# Patient Record
Sex: FEMALE | Race: WHITE | NOT HISPANIC OR LATINO | Employment: FULL TIME | URBAN - METROPOLITAN AREA
[De-identification: names, ages, dates, MRNs, and addresses within clinical notes are randomized per-mention and may not be internally consistent; named-entity substitution may affect disease eponyms.]

---

## 2017-01-03 ENCOUNTER — RESOLUTE PROFESSIONAL BILLING HOSPITAL PROF FEE (OUTPATIENT)
Dept: HOSPITALIST | Facility: MEDICAL CENTER | Age: 62
End: 2017-01-03
Payer: OTHER GOVERNMENT

## 2017-01-03 ENCOUNTER — APPOINTMENT (OUTPATIENT)
Dept: RADIOLOGY | Facility: MEDICAL CENTER | Age: 62
End: 2017-01-03
Attending: EMERGENCY MEDICINE
Payer: OTHER GOVERNMENT

## 2017-01-03 ENCOUNTER — HOSPITAL ENCOUNTER (OUTPATIENT)
Facility: MEDICAL CENTER | Age: 62
End: 2017-01-04
Attending: EMERGENCY MEDICINE | Admitting: INTERNAL MEDICINE
Payer: OTHER GOVERNMENT

## 2017-01-03 DIAGNOSIS — R06.02 SHORTNESS OF BREATH: ICD-10-CM

## 2017-01-03 DIAGNOSIS — R07.9 CHEST PAIN, UNSPECIFIED TYPE: ICD-10-CM

## 2017-01-03 LAB
ALBUMIN SERPL BCP-MCNC: 4.2 G/DL (ref 3.2–4.9)
ALBUMIN/GLOB SERPL: 1.8 G/DL
ALP SERPL-CCNC: 70 U/L (ref 30–99)
ALT SERPL-CCNC: 20 U/L (ref 2–50)
ANION GAP SERPL CALC-SCNC: 8 MMOL/L (ref 0–11.9)
APTT PPP: 30.5 SEC (ref 24.7–36)
AST SERPL-CCNC: 32 U/L (ref 12–45)
BASOPHILS # BLD AUTO: 0.8 % (ref 0–1.8)
BASOPHILS # BLD: 0.05 K/UL (ref 0–0.12)
BILIRUB SERPL-MCNC: 0.8 MG/DL (ref 0.1–1.5)
BNP SERPL-MCNC: 19 PG/ML (ref 0–100)
BUN SERPL-MCNC: 9 MG/DL (ref 8–22)
CALCIUM SERPL-MCNC: 9.5 MG/DL (ref 8.4–10.2)
CHLORIDE SERPL-SCNC: 105 MMOL/L (ref 96–112)
CO2 SERPL-SCNC: 25 MMOL/L (ref 20–33)
CREAT SERPL-MCNC: 0.77 MG/DL (ref 0.5–1.4)
DEPRECATED D DIMER PPP IA-ACNC: <200 NG/ML(D-DU)
EKG IMPRESSION: NORMAL
EKG IMPRESSION: NORMAL
EOSINOPHIL # BLD AUTO: 0.08 K/UL (ref 0–0.51)
EOSINOPHIL NFR BLD: 1.3 % (ref 0–6.9)
ERYTHROCYTE [DISTWIDTH] IN BLOOD BY AUTOMATED COUNT: 41.2 FL (ref 35.9–50)
GFR SERPL CREATININE-BSD FRML MDRD: >60 ML/MIN/1.73 M 2
GLOBULIN SER CALC-MCNC: 2.3 G/DL (ref 1.9–3.5)
GLUCOSE SERPL-MCNC: 99 MG/DL (ref 65–99)
HCT VFR BLD AUTO: 40.7 % (ref 37–47)
HGB BLD-MCNC: 14 G/DL (ref 12–16)
IMM GRANULOCYTES # BLD AUTO: 0.02 K/UL (ref 0–0.11)
IMM GRANULOCYTES NFR BLD AUTO: 0.3 % (ref 0–0.9)
INR PPP: 0.94 (ref 0.87–1.13)
LIPASE SERPL-CCNC: 30 U/L (ref 7–58)
LYMPHOCYTES # BLD AUTO: 1.61 K/UL (ref 1–4.8)
LYMPHOCYTES NFR BLD: 26.7 % (ref 22–41)
MCH RBC QN AUTO: 31.2 PG (ref 27–33)
MCHC RBC AUTO-ENTMCNC: 34.4 G/DL (ref 33.6–35)
MCV RBC AUTO: 90.6 FL (ref 81.4–97.8)
MONOCYTES # BLD AUTO: 0.39 K/UL (ref 0–0.85)
MONOCYTES NFR BLD AUTO: 6.5 % (ref 0–13.4)
NEUTROPHILS # BLD AUTO: 3.89 K/UL (ref 2–7.15)
NEUTROPHILS NFR BLD: 64.4 % (ref 44–72)
NRBC # BLD AUTO: 0 K/UL
NRBC BLD AUTO-RTO: 0 /100 WBC
PLATELET # BLD AUTO: 268 K/UL (ref 164–446)
PMV BLD AUTO: 8.9 FL (ref 9–12.9)
POTASSIUM SERPL-SCNC: 3.9 MMOL/L (ref 3.6–5.5)
PROT SERPL-MCNC: 6.5 G/DL (ref 6–8.2)
PROTHROMBIN TIME: 12.4 SEC (ref 12–14.6)
RBC # BLD AUTO: 4.49 M/UL (ref 4.2–5.4)
SODIUM SERPL-SCNC: 138 MMOL/L (ref 135–145)
TROPONIN I SERPL-MCNC: <0.02 NG/ML (ref 0–0.04)
TROPONIN I SERPL-MCNC: <0.02 NG/ML (ref 0–0.04)
TROPONIN I SERPL-MCNC: <0.05 NG/ML
WBC # BLD AUTO: 6 K/UL (ref 4.8–10.8)

## 2017-01-03 PROCEDURE — 700102 HCHG RX REV CODE 250 W/ 637 OVERRIDE(OP)

## 2017-01-03 PROCEDURE — 700102 HCHG RX REV CODE 250 W/ 637 OVERRIDE(OP): Performed by: EMERGENCY MEDICINE

## 2017-01-03 PROCEDURE — 85610 PROTHROMBIN TIME: CPT

## 2017-01-03 PROCEDURE — 71010 DX-CHEST-PORTABLE (1 VIEW): CPT

## 2017-01-03 PROCEDURE — 99285 EMERGENCY DEPT VISIT HI MDM: CPT

## 2017-01-03 PROCEDURE — A9270 NON-COVERED ITEM OR SERVICE: HCPCS | Performed by: INTERNAL MEDICINE

## 2017-01-03 PROCEDURE — 36415 COLL VENOUS BLD VENIPUNCTURE: CPT

## 2017-01-03 PROCEDURE — 96360 HYDRATION IV INFUSION INIT: CPT

## 2017-01-03 PROCEDURE — G0378 HOSPITAL OBSERVATION PER HR: HCPCS

## 2017-01-03 PROCEDURE — 85379 FIBRIN DEGRADATION QUANT: CPT

## 2017-01-03 PROCEDURE — 83690 ASSAY OF LIPASE: CPT

## 2017-01-03 PROCEDURE — 85730 THROMBOPLASTIN TIME PARTIAL: CPT

## 2017-01-03 PROCEDURE — 700102 HCHG RX REV CODE 250 W/ 637 OVERRIDE(OP): Performed by: INTERNAL MEDICINE

## 2017-01-03 PROCEDURE — 84484 ASSAY OF TROPONIN QUANT: CPT | Mod: 91

## 2017-01-03 PROCEDURE — 85025 COMPLETE CBC W/AUTO DIFF WBC: CPT

## 2017-01-03 PROCEDURE — 96361 HYDRATE IV INFUSION ADD-ON: CPT

## 2017-01-03 PROCEDURE — A9270 NON-COVERED ITEM OR SERVICE: HCPCS

## 2017-01-03 PROCEDURE — 700105 HCHG RX REV CODE 258: Performed by: EMERGENCY MEDICINE

## 2017-01-03 PROCEDURE — 80053 COMPREHEN METABOLIC PANEL: CPT

## 2017-01-03 PROCEDURE — 93005 ELECTROCARDIOGRAM TRACING: CPT | Performed by: EMERGENCY MEDICINE

## 2017-01-03 PROCEDURE — 99220 PR INITIAL OBSERVATION CARE,LEVL III: CPT | Performed by: INTERNAL MEDICINE

## 2017-01-03 PROCEDURE — 83880 ASSAY OF NATRIURETIC PEPTIDE: CPT

## 2017-01-03 PROCEDURE — A9270 NON-COVERED ITEM OR SERVICE: HCPCS | Performed by: EMERGENCY MEDICINE

## 2017-01-03 RX ORDER — ONDANSETRON 2 MG/ML
4 INJECTION INTRAMUSCULAR; INTRAVENOUS EVERY 4 HOURS PRN
Status: DISCONTINUED | OUTPATIENT
Start: 2017-01-03 | End: 2017-01-04 | Stop reason: HOSPADM

## 2017-01-03 RX ORDER — NITROGLYCERIN 0.4 MG/1
0.4 TABLET SUBLINGUAL ONCE
Status: COMPLETED | OUTPATIENT
Start: 2017-01-03 | End: 2017-01-03

## 2017-01-03 RX ORDER — SUMATRIPTAN 25 MG/1
100 TABLET, FILM COATED ORAL
Status: DISCONTINUED | OUTPATIENT
Start: 2017-01-03 | End: 2017-01-04 | Stop reason: HOSPADM

## 2017-01-03 RX ORDER — LAMOTRIGINE 100 MG/1
200 TABLET ORAL 2 TIMES DAILY
Status: DISCONTINUED | OUTPATIENT
Start: 2017-01-03 | End: 2017-01-04 | Stop reason: HOSPADM

## 2017-01-03 RX ORDER — ATORVASTATIN CALCIUM 10 MG/1
10 TABLET, FILM COATED ORAL
Status: DISCONTINUED | OUTPATIENT
Start: 2017-01-03 | End: 2017-01-04 | Stop reason: HOSPADM

## 2017-01-03 RX ORDER — ACETAMINOPHEN 325 MG/1
650 TABLET ORAL EVERY 6 HOURS PRN
Status: DISCONTINUED | OUTPATIENT
Start: 2017-01-03 | End: 2017-01-04 | Stop reason: HOSPADM

## 2017-01-03 RX ORDER — ONDANSETRON 4 MG/1
4 TABLET, ORALLY DISINTEGRATING ORAL EVERY 4 HOURS PRN
Status: DISCONTINUED | OUTPATIENT
Start: 2017-01-03 | End: 2017-01-04 | Stop reason: HOSPADM

## 2017-01-03 RX ORDER — NITROGLYCERIN 0.4 MG/1
TABLET SUBLINGUAL
Status: COMPLETED
Start: 2017-01-03 | End: 2017-01-03

## 2017-01-03 RX ORDER — PROMETHAZINE HYDROCHLORIDE 25 MG/1
12.5-25 TABLET ORAL EVERY 4 HOURS PRN
Status: DISCONTINUED | OUTPATIENT
Start: 2017-01-03 | End: 2017-01-04 | Stop reason: HOSPADM

## 2017-01-03 RX ORDER — PROMETHAZINE HYDROCHLORIDE 25 MG/1
12.5-25 SUPPOSITORY RECTAL EVERY 4 HOURS PRN
Status: DISCONTINUED | OUTPATIENT
Start: 2017-01-03 | End: 2017-01-04 | Stop reason: HOSPADM

## 2017-01-03 RX ORDER — ASPIRIN 81 MG/1
324 TABLET, CHEWABLE ORAL ONCE
Status: COMPLETED | OUTPATIENT
Start: 2017-01-03 | End: 2017-01-03

## 2017-01-03 RX ORDER — OMEPRAZOLE 20 MG/1
20 CAPSULE, DELAYED RELEASE ORAL DAILY
COMMUNITY
End: 2019-11-08

## 2017-01-03 RX ORDER — SODIUM CHLORIDE 9 MG/ML
INJECTION, SOLUTION INTRAVENOUS CONTINUOUS
Status: DISCONTINUED | OUTPATIENT
Start: 2017-01-03 | End: 2017-01-04 | Stop reason: HOSPADM

## 2017-01-03 RX ORDER — IBUPROFEN 200 MG
800 TABLET ORAL EVERY 6 HOURS PRN
COMMUNITY
End: 2021-02-10

## 2017-01-03 RX ORDER — MORPHINE SULFATE 4 MG/ML
2-4 INJECTION, SOLUTION INTRAMUSCULAR; INTRAVENOUS
Status: DISCONTINUED | OUTPATIENT
Start: 2017-01-03 | End: 2017-01-04 | Stop reason: HOSPADM

## 2017-01-03 RX ORDER — LAMOTRIGINE 200 MG/1
200 TABLET ORAL 2 TIMES DAILY
COMMUNITY
End: 2019-11-08

## 2017-01-03 RX ORDER — SUMATRIPTAN 100 MG/1
100 TABLET, FILM COATED ORAL
COMMUNITY
End: 2019-11-08

## 2017-01-03 RX ORDER — NITROGLYCERIN 0.4 MG/1
0.4 TABLET SUBLINGUAL
Status: DISCONTINUED | OUTPATIENT
Start: 2017-01-03 | End: 2017-01-04 | Stop reason: HOSPADM

## 2017-01-03 RX ADMIN — NITROGLYCERIN 0.4 MG: 0.4 TABLET SUBLINGUAL at 09:28

## 2017-01-03 RX ADMIN — ACETAMINOPHEN 650 MG: 325 TABLET, FILM COATED ORAL at 12:09

## 2017-01-03 RX ADMIN — LAMOTRIGINE 200 MG: 100 TABLET ORAL at 19:49

## 2017-01-03 RX ADMIN — SODIUM CHLORIDE: 9 INJECTION, SOLUTION INTRAVENOUS at 08:36

## 2017-01-03 RX ADMIN — ACETAMINOPHEN 650 MG: 325 TABLET, FILM COATED ORAL at 18:13

## 2017-01-03 RX ADMIN — ASPIRIN 81 MG CHEWABLE TABLET 324 MG: 81 TABLET CHEWABLE at 08:35

## 2017-01-03 ASSESSMENT — PAIN SCALES - WONG BAKER
WONGBAKER_NUMERICALRESPONSE: HURTS JUST A LITTLE BIT
WONGBAKER_NUMERICALRESPONSE: HURTS EVEN MORE

## 2017-01-03 ASSESSMENT — LIFESTYLE VARIABLES
EVER_SMOKED: NEVER
ALCOHOL_USE: NO

## 2017-01-03 ASSESSMENT — PAIN SCALES - GENERAL
PAINLEVEL_OUTOF10: 4
PAINLEVEL_OUTOF10: 6
PAINLEVEL_OUTOF10: 2

## 2017-01-03 NOTE — IP AVS SNAPSHOT
hi5 Access Code: [email or web address]  Expires: 2/3/2017  3:27 PM    hi5  A secure, online tool to manage your health information     [employer]’s hi5® is a secure, online tool that connects you to your personalized health information from the privacy of your home -- day or night - making it very easy for you to manage your healthcare. Once the activation process is completed, you can even access your medical information using the hi5 trey, which is available for free in the Apple Trey store or Google Play store.     hi5 provides the following levels of access (as shown below):   My Chart Features   [facility] Primary Care Doctor [facility]  Specialists [facility]  Urgent  Care Non-[facility]  Primary Care  Doctor   Email your healthcare team securely and privately 24/7 X X X X   Manage appointments: schedule your next appointment; view details of past/upcoming appointments X      Request prescription refills. X      View recent personal medical records, including lab and immunizations X X X X   View health record, including health history, allergies, medications X X X X   Read reports about your outpatient visits, procedures, consult and ER notes X X X X   See your discharge summary, which is a recap of your hospital and/or ER visit that includes your diagnosis, lab results, and care plan. X X       How to register for hi5:  1. Go to  [email or web address].  2. Click on the Sign Up Now box, which takes you to the New Member Sign Up page. You will need to provide the following information:  a. Enter your hi5 Access Code exactly as it appears at the top of this page. (You will not need to use this code after you’ve completed the sign-up process. If you do not sign up before the expiration date, you must request a new code.)   b. Enter your date of birth.   c. Enter your home email address.   d. Click Submit, and follow the next screen’s instructions.  3. Create a hi5 ID. This will be your hi5  login ID and cannot be changed, so think of one that is secure and easy to remember.  4. Create a [employer] password. You can change your password at any time.  5. Enter your Password Reset Question and Answer. This can be used at a later time if you forget your password.   6. Enter your e-mail address. This allows you to receive e-mail notifications when new information is available in [employer].  7. Click Sign Up. You can now view your health information.    For assistance activating your [employer] account, call [phone]

## 2017-01-03 NOTE — IP AVS SNAPSHOT
" <p align=\"LEFT\"><IMG SRC=\"//[email or web address]$/[email or web address]\" alt=\"Image\" WIDTH=\"50%\" HEIGHT=\"200\" BORDER=\"\"></p>                   Name:Chloé Cummings  Medical Record Number:[id number]  CSN: [id number]    YOB: 1955   Age: 61 y.o.  Sex: female  HT:1.676 m (5' 6\") WT: 68.1 kg (150 lb 2.1 oz)          Admit Date: 1/3/2017     Discharge Date:   Today's Date: 1/4/2017  Attending Doctor:  Madhuri Collado M.D.                  Allergies:  Nkda          Follow-up Information     1. Follow up with Nikki Lyn M.D.. Schedule an appointment as soon as possible for a visit in 2 weeks.    Specialty:  Family Medicine    Contact information    [address] [address]  [phone]           Medication List      Take these Medications        Instructions    aspirin 81 MG EC tablet    Take 1 Tab by mouth every day.   Dose:  81 mg       ibuprofen 200 MG Tabs   Commonly known as:  MOTRIN    Take 800 mg by mouth every 6 hours as needed for Mild Pain.   Dose:  800 mg       IMITREX 100 MG tablet   Generic drug:  sumatriptan    Take 100 mg by mouth Once PRN for Migraine.   Dose:  100 mg       LAMICTAL 200 MG tablet   Generic drug:  lamotrigine    Take 200 mg by mouth 2 times a day.   Dose:  200 mg       omeprazole 20 MG delayed-release capsule   Commonly known as:  PRILOSEC    Take 20 mg by mouth every day.   Dose:  20 mg         "

## 2017-01-03 NOTE — PROGRESS NOTES
Pt arrived to the floor about 1136, pt able to ambulate from the gurney to the bed independently. Admit profile and poc reviewed including no caffeine.  Kitchen at bedside during admit and lunch was ordered.  EKG done about 1315 when the order downloaded into the system. 2nd troponin negative. VSS.  Pt complains of a headache, but states the chest pain is getting better.  Pt has been showing SR thus far on her tele monitoring.

## 2017-01-03 NOTE — H&P
PRIMARY CARE PHYSICIAN:  Nikki Lyn MD    CHIEF COMPLAINT:  Chest pain.    HISTORY OF PRESENT ILLNESS:  This is a 61-year-old female with a past medical   history significant for history of SVT, migraines, who presents today for   evaluation of chest pain.  Patient states that this morning she felt dizzy and   lightheaded and was running into things, but denies any loss of   consciousness, vision changes, numbness, tingling, or headache.  She then   around 7 a.m. noticed she was having midsternal chest pain with radiation to   her breast and left axillary region associated with shortness of breath and   anxiety.  She also had nausea associated with it.  She describes it as a dull   pressing pain.  She states that she took nitro sublingual and her pain   subsided, but presently rates the pain at 3/10.  Denies any trauma to the   area.  Denies any constipation, diarrhea, abdominal pain, fevers, chills,   emesis, cough, sputum production, or diaphoresis.    REVIEW OF SYSTEMS:  A comprehensive review of systems was conducted and all   pertinent positive and negative are documented in the HPI.    PAST MEDICAL HISTORY:  Significant for migraines, history of SVT.    PAST SURGICAL HISTORY:  Significant for meningioma status post resection,   bilateral knee arthroscopy, and right shoulder arthroscopy.    SOCIAL HISTORY:  Patient denies any tobacco or illicit drug use.  She drinks   alcohol once a year.    FAMILY HISTORY:  Significant for mother who had MI at age 74 and hypertension.    ALLERGIES:  Patient has no known drug allergies.    HOME MEDICATIONS:  Significant for ibuprofen 800 mg q. 6 hours p.r.n. for   pain, Lamictal 200 mg b.i.d., and Imitrex 100 mg once p.r.n. for migraines.    PHYSICAL EXAMINATION:  VITAL SIGNS:  Temperature of 37.1, heart rate of 62, respirations of 16, blood   pressure of 137/74, and O2 saturation 97% on room air.  GENERAL:  This is a 61-year-old pleasant cooperative female in no acute    distress.  HEENT:  Normocephalic and atraumatic.  Pupils equal and reactive to light.    Extraocular motions intact.  Moist oral mucosa noted.  NECK:  Supple, normal range of motion.  No lymphadenopathy noted.  CARDIOVASCULAR:  Regular rate and rhythm.  No murmurs, rubs, or gallops noted.  LUNGS:  Clear to auscultation bilaterally.  No rhonchi, wheezes, or rales   heard.  ABDOMEN:  Soft, nontender, and nondistended.  Bowel sounds present.  EXTREMITIES:  No clubbing, cyanosis, or edema noted.  NEUROLOGIC:  Alert, awake, and oriented x3.  No focal deficits noted.  Cranial   nerves II-XII are grossly intact.  PSYCHIATRIC:  Normal mood, normal affect, normal judgment.  SKIN:  Warm, dry, no erythema or rash noted.    LABORATORY DATA:  WBC of 6, hemoglobin of 14, hematocrit of 40.7, and   platelets of 268.  INR of 0.94.  Lipase of 30.  D-dimer less than 200.    Troponin less than 0.05.  BNP of 19.  BUN of 9 and creatinine of 0.77.  Lipase   of 30.    IMAGING:  Chest x-ray shows no acute cardiopulmonary process.  EKG per my   read, sinus rhythm, rate of 75, QTC of 420.  No acute ST or T-wave   abnormalities noted.  No previous EKG available for comparison.    ASSESSMENT AND PLAN:  1.  For patient's chest pain, patient will be admitted to telemetry and we   will continue to monitor patient's troponin and EKG.  Nuclear stress test is   ordered for further evaluation.  Patient is started on aspirin 81 mg daily and   Lipitor 10 mg daily.  Lipid panel is ordered.  We will order morphine and   nitroglycerin sublingual p.r.n. for chest pain.  2.  For patient's migraines, patient is continued on her home regimen of   Lamictal 200 mg twice a day and Imitrex p.r.n.    DISPOSITION:  Observation.    PROPHYLAXIS:  Patient started on Lovenox subQ for DVT prophylaxis.  No GI   prophylaxis indicated.    CODE STATUS:  Full.       ____________________________________     MARQUES LÓPEZ MD MS / DARRON    DD:  01/03/2017 10:52:55  DT:   01/03/2017 11:15:00    D#:  [id number]  Job#:  [id number]

## 2017-01-03 NOTE — ED NOTES
Patient up to restroom. Patient given warm blanket. Patient states no change in symptoms post nitro.

## 2017-01-03 NOTE — IP AVS SNAPSHOT
1/4/2017          Chloé Cummings  [address] [place] [address]    Dear Chloé:    [facility] wants to ensure your discharge home is safe and you or your loved ones have had all your questions answered regarding your care after you leave the hospital.    You may receive a telephone call within two days of your discharge.  This call is to make certain you understand your discharge instructions as well as ensure we provided you with the best care possible during your stay with us.     The call will only last approximately 3-5 minutes and will be done by a nurse.    Once again, we want to ensure your discharge home is safe and that you have a clear understanding of any next steps in your care.  If you have any questions or concerns, please do not hesitate to contact us, we are here for you.  Thank you for choosing [facility] for your healthcare needs.    Sincerely,    Matthew Durbin    [facility]

## 2017-01-03 NOTE — ED PROVIDER NOTES
ED Provider Note    CHIEF COMPLAINT  No chief complaint on file.      HPI  Chloé Cummings is a 61 y.o. female who presents chest pain, mid chest, around 7 AM, moderate pain, did not wake her up, pain is partially burning, radiates to her left axilla. No history of similar episodes in the past. Does not take aspirin did not take nitroglycerin. Never gets chest pain with exercise. No nausea no vomiting no diaphoresis but does have some radiation of the pain, no other modifiers.    REVIEW OF SYSTEMS  See HPI for further details. History of bipolar disease, migraine headaches, herpes genitalis Denies other G.I., G.U.. endrocine, cardiovascular, respriatory or neurological problems. All other systems are negative.     PAST MEDICAL HISTORY  Past Medical History   Diagnosis Date   • Insomnia    • Bipolar 1 disorder (HCC)    • Migraines    • Herpes genitalis in women        FAMILY HISTORY mother with previous heart attack  Family History   Problem Relation Age of Onset   • Stroke Mother    • Heart Attack Mother    • Cancer Father      Lung   • Stroke Sister        SOCIAL HISTORY   he is a nonsmoker Social History     Social History   • Marital Status: Single     Spouse Name: N/A   • Number of Children: N/A   • Years of Education: N/A     Social History Main Topics   • Smoking status: Never Smoker    • Smokeless tobacco: Never Used   • Alcohol Use: No   • Drug Use: No   • Sexual Activity: Not Currently     Other Topics Concern   • None     Social History Narrative       SURGICAL HISTORY  Past Surgical History   Procedure Laterality Date   • Craniotomy  2011     Meningioma   • Arthroscopy, knee     • Shoulder arthroscopy         CURRENT MEDICATIONS  Home Medications     **Home medications have not yet been reviewed for this encounter**          ALLERGIES  Allergies   Allergen Reactions   • Nkda [No Known Drug Allergy]        PHYSICAL EXAM  VITAL SIGNS: /86 mmHg  Pulse 92  Temp(Src) 37.1 °C (98.8 °F)  Resp 16  Ht 1.676  "m (5' 6\")  Wt 67.7 kg (149 lb 4 oz)  BMI 24.10 kg/m2  Constitutional: Well developed, Well nourished, she appears very anxious No acute distress, Non-toxic appearance.   HENT: Normocephalic, Atraumatic, Bilateral external ears normal, Oropharynx moist, No oral exudates, Nose normal.   Eyes: PERRL, EOMI, Conjunctiva normal, No discharge.   Neck: Normal range of motion, No tenderness, Supple, No stridor.   Lymphatic: No lymphadenopathy noted.   Cardiovascular: Normal heart rate, Normal rhythm, No murmurs, No rubs, No gallops.   Thorax & Lungs: Normal breath sounds, No respiratory distress, No wheezing, No chest tenderness.   Abdomen:  No tenderness, no guarding no rigidity and the abdomen is soft.  No masses, No pulsatile masses.  Skin: Warm, Dry, No erythema, No rash.   Back: No tenderness, No CVA tenderness.   Extremities: Intact distal pulses, No edema, No tenderness, No cyanosis, No clubbing.   Musculoskeletal: Good range of motion in all major joints. No tenderness to palpation or major deformities noted.   Neurologic: Alert & oriented x 3, Normal motor function, Normal sensory function, No focal deficits noted.   Psychiatric: Affect normal, Judgment normal, Mood normal.   EKG Interpretation    Interpreted by me    Rhythm: normal sinus   Rate: normal  Axis: normal  Ectopy: none  Conduction: normal  ST Segments: no acute change  T Waves: no acute change  Q Waves: none    Clinical Impression: I do not have an old EKG to compare to      RADIOLOGY/PROCEDURES  DX-CHEST-PORTABLE (1 VIEW)   Final Result      Upper normal heart size without evidence of edema            COURSE & MEDICAL DECISION MAKING  Pertinent Labs & Imaging studies reviewed. (See chart for details)    Is having chest pain, moderate, partially pruritic, given IV normal saline nitroglycerin, aspirin. She does have risk factors for heart disease, will talk with hospitalist about admission.  FINAL IMPRESSION  1.   1. Chest pain, unspecified type    2. " Shortness of breath        2.   3.     Disposition    Electronically signed by: Panda Meza, 1/3/2017 8:16 AM

## 2017-01-03 NOTE — IP AVS SNAPSHOT
" After Visit Summary                                                                                                                  Name:Chloé Cummings  Medical Record Number:[id number]  CSN: [id number]    YOB: 1955   Age: 61 y.o.  Sex: female  HT:1.676 m (5' 6\") WT: 68.1 kg (150 lb 2.1 oz)          Admit Date: 1/3/2017     Discharge Date:   Today's Date: 1/4/2017  Attending Doctor:  Madhuri Collado M.D.                  Allergies:  Nkda            Discharge Instructions       Discharge Instructions    Discharged to home by car with self. Discharged via walking, hospital escort: Yes.  Special equipment needed: Not Applicable    Be sure to schedule a follow-up appointment with your primary care doctor or any specialists as instructed.     Discharge Plan:   Diet Plan: Discussed  Activity Level: Discussed  Confirmed Follow up Appointment: Patient to Call and Schedule Appointment  Confirmed Symptoms Management: Discussed  Medication Reconciliation Updated: Yes  Influenza Vaccine Indication: Not indicated: Previously immunized this influenza season and > 8 years of age    I understand that a diet low in cholesterol, fat, and sodium is recommended for good health. Unless I have been given specific instructions below for another diet, I accept this instruction as my diet prescription.   Other diet: keep yourself hydrated.    Special Instructions:      Nonspecific Chest Pain  It is often hard to find the cause of chest pain. There is always a chance that your pain could be related to something serious, such as a heart attack or a blood clot in your lungs. Chest pain can also be caused by conditions that are not life-threatening. If you have chest pain, it is very important to follow up with your doctor.    HOME CARE  · If you were prescribed an antibiotic medicine, finish it all even if you start to feel better.  · Avoid any activities that cause chest pain.  · Do not use any tobacco products, including cigarettes, " chewing tobacco, or electronic cigarettes. If you need help quitting, ask your doctor.  · Do not drink alcohol.  · Take medicines only as told by your doctor.  · Keep all follow-up visits as told by your doctor. This is important. This includes any further testing if your chest pain does not go away.  · Your doctor may tell you to keep your head raised (elevated) while you sleep.  · Make lifestyle changes as told by your doctor. These may include:  · Getting regular exercise. Ask your doctor to suggest some activities that are safe for you.  · Eating a heart-healthy diet. Your doctor or a diet specialist (dietitian) can help you to learn healthy eating options.  · Maintaining a healthy weight.  · Managing diabetes, if necessary.  · Reducing stress.  GET HELP IF:  · Your chest pain does not go away, even after treatment.  · You have a rash with blisters on your chest.  · You have a fever.  GET HELP RIGHT AWAY IF:  · Your chest pain is worse.  · You have an increasing cough, or you cough up blood.  · You have severe belly (abdominal) pain.  · You feel extremely weak.  · You pass out (faint).  · You have chills.  · You have sudden, unexplained chest discomfort.  · You have sudden, unexplained discomfort in your arms, back, neck, or jaw.  · You have shortness of breath at any time.  · You suddenly start to sweat, or your skin gets clammy.  · You feel nauseous.  · You vomit.  · You suddenly feel light-headed or dizzy.  · Your heart begins to beat quickly, or it feels like it is skipping beats.  These symptoms may be an emergency. Do not wait to see if the symptoms will go away. Get medical help right away. Call your local emergency services (911 in the [place] Do not drive yourself to the hospital.     This information is not intended to replace advice given to you by your health care provider. Make sure you discuss any questions you have with your health care provider.     Document Released: 06/05/2009 Document Revised:  01/08/2016 Document Reviewed: 07/24/2015  [email or web address] Interactive Patient Education ©2016 [email or web address]        · Is patient discharged on Warfarin / Coumadin?   No     · Is patient Post Blood Transfusion?  No    Depression / Suicide Risk    As you are discharged from this [facility] facility, it is important to learn how to keep safe from harming yourself.    Recognize the warning signs:  · Abrupt changes in personality, positive or negative- including increase in energy   · Giving away possessions  · Change in eating patterns- significant weight changes-  positive or negative  · Change in sleeping patterns- unable to sleep or sleeping all the time   · Unwillingness or inability to communicate  · Depression  · Unusual sadness, discouragement and loneliness  · Talk of wanting to die  · Neglect of personal appearance   · Rebelliousness- reckless behavior  · Withdrawal from people/activities they love  · Confusion- inability to concentrate     If you or a loved one observes any of these behaviors or has concerns about self-harm, here's what you can do:  · Talk about it- your feelings and reasons for harming yourself  · Remove any means that you might use to hurt yourself (examples: pills, rope, extension cords, firearm)  · Get professional help from the community (Mental Health, Substance Abuse, psychological counseling)  · Do not be alone:Call your Safe Contact- someone whom you trust who will be there for you.  · Call your local CRISIS HOTLINE 764-0510 or 804-747-9100  · Call your local Children's Mobile Crisis Response Team [place] (186) 283-7003 or www.USEUM  · Call the toll free National Suicide Prevention Hotlines   · National Suicide Prevention Lifeline 039-103-XEDA (9221)  · National Hope Line Network 800-SUICIDE (075-7610)        Follow-up Information     1. Follow up with Nikki Lyn M.D.. Schedule an appointment as soon as possible for a visit in 2 weeks.    Specialty:  Family Medicine      Contact information    [address] [address]  [place] [address]  [phone]           Discharge Medication Instructions:    Below are the medications your physician expects you to take upon discharge:    Review all your home medications and newly ordered medications with your doctor and/or pharmacist. Follow medication instructions as directed by your doctor and/or pharmacist.    Please keep your medication list with you and share with your physician.               Medication List      START taking these medications        Instructions    aspirin 81 MG EC tablet   Last time this was given:  81 mg on 1/4/2017  7:36 AM    Take 1 Tab by mouth every day.   Dose:  81 mg         CONTINUE taking these medications        Instructions    ibuprofen 200 MG Tabs   Commonly known as:  MOTRIN    Take 800 mg by mouth every 6 hours as needed for Mild Pain.   Dose:  800 mg       IMITREX 100 MG tablet   Last time this was given:  100 mg on 1/4/2017  7:39 AM   Generic drug:  sumatriptan    Take 100 mg by mouth Once PRN for Migraine.   Dose:  100 mg       LAMICTAL 200 MG tablet   Last time this was given:  200 mg on 1/3/2017  7:49 PM   Generic drug:  lamotrigine    Take 200 mg by mouth 2 times a day.   Dose:  200 mg       omeprazole 20 MG delayed-release capsule   Commonly known as:  PRILOSEC    Take 20 mg by mouth every day.   Dose:  20 mg               Instructions           Diet / Nutrition:    Follow any diet instructions given to you by your doctor or the dietician, including how much salt (sodium) you are allowed each day.    If you are overweight, talk to your doctor about a weight reduction plan.    Activity:    Remain physically active following your doctor's instructions about exercise and activity.    Rest often.     Any time you become even a little tired or short of breath, SIT DOWN and rest.    Worsening Symptoms:    Report any of the following signs and symptoms to the doctor's office immediately:    *Pain of jaw,  arm, or neck  *Chest pain not relieved by medication                               *Dizziness or loss of consciousness  *Difficulty breathing even when at rest   *More tired than usual                                       *Bleeding drainage or swelling of surgical site  *Swelling of feet, ankles, legs or stomach                 *Fever (>100ºF)  *Pink or blood tinged sputum  *Weight gain (3lbs/day or 5lbs /week)           *Shock from internal defibrillator (if applicable)  *Palpitations or irregular heartbeats                *Cool and/or numb extremities    Stroke Awareness    Common Risk Factors for Stroke include:    Age  Atrial Fibrillation  Carotid Artery Stenosis  Diabetes Mellitus  Excessive alcohol consumption  High blood pressure  Overweight   Physical inactivity  Smoking    Warning signs and symptoms of a stroke include:    *Sudden numbness or weakness of the face, arm or leg (especially on one side of the body).  *Sudden confusion, trouble speaking or understanding.  *Sudden trouble seeing in one or both eyes.  *Sudden trouble walking, dizziness, loss of balance or coordination.Sudden severe headache with no known cause.    It is very important to get treatment quickly when a stroke occurs. If you experience any of the above warning signs, call 911 immediately.                   Disclaimer         Quit Smoking / Tobacco Use:    I understand the use of any tobacco products increases my chance of suffering from future heart disease or stroke and could cause other illnesses which may shorten my life. Quitting the use of tobacco products is the single most important thing I can do to improve my health. For further information on smoking / tobacco cessation call a Toll Free Quit Line at 1-237.927.8024 (*National Cancer [place]) or 1-164.307.9061 (American Lung Association) or you can access the web based program at www.lungusa.org.    [place] Tobacco Users Help Line:  (429) 247-8908       Toll Free:  7-135-816-9007  Quit Tobacco Program [facility] (475)[phone]    Crisis Hotline:    National Crisis Hotline:  2-096-RAMZIND or 1-114.255.1135    [place] Crisis Hotline:    1-758.825.9540 or 826-591-5527    Discharge Survey:   Thank you for choosing [facility]. We hope we did everything we could to make your hospital stay a pleasant one. You may be receiving a phone survey and we would appreciate your time and participation in answering the questions. Your input is very valuable to us in our efforts to improve our service to our patients and their families.        My signature on this form indicates that:    1. I have reviewed and understand the above information.  2. My questions regarding this information have been answered to my satisfaction.  3. I have formulated a plan with my discharge nurse to obtain my prescribed medications for home.                  Disclaimer         __________________________________                     __________       ________                       Patient Signature                                                 Date                    Time

## 2017-01-04 ENCOUNTER — APPOINTMENT (OUTPATIENT)
Dept: RADIOLOGY | Facility: MEDICAL CENTER | Age: 62
End: 2017-01-04
Attending: INTERNAL MEDICINE
Payer: OTHER GOVERNMENT

## 2017-01-04 VITALS
TEMPERATURE: 97.2 F | WEIGHT: 150.13 LBS | BODY MASS INDEX: 24.13 KG/M2 | SYSTOLIC BLOOD PRESSURE: 127 MMHG | DIASTOLIC BLOOD PRESSURE: 57 MMHG | HEIGHT: 66 IN | OXYGEN SATURATION: 95 % | HEART RATE: 64 BPM | RESPIRATION RATE: 18 BRPM

## 2017-01-04 PROBLEM — R07.9 CHEST PAIN: Status: RESOLVED | Noted: 2017-01-03 | Resolved: 2017-01-04

## 2017-01-04 LAB
ANION GAP SERPL CALC-SCNC: 7 MMOL/L (ref 0–11.9)
BUN SERPL-MCNC: 7 MG/DL (ref 8–22)
CALCIUM SERPL-MCNC: 8.8 MG/DL (ref 8.4–10.2)
CHLORIDE SERPL-SCNC: 109 MMOL/L (ref 96–112)
CHOLEST SERPL-MCNC: 247 MG/DL (ref 100–199)
CO2 SERPL-SCNC: 25 MMOL/L (ref 20–33)
CREAT SERPL-MCNC: 0.74 MG/DL (ref 0.5–1.4)
ERYTHROCYTE [DISTWIDTH] IN BLOOD BY AUTOMATED COUNT: 42.2 FL (ref 35.9–50)
GFR SERPL CREATININE-BSD FRML MDRD: >60 ML/MIN/1.73 M 2
GLUCOSE SERPL-MCNC: 92 MG/DL (ref 65–99)
HCT VFR BLD AUTO: 39.3 % (ref 37–47)
HDLC SERPL-MCNC: 88 MG/DL
HGB BLD-MCNC: 13.4 G/DL (ref 12–16)
LDLC SERPL CALC-MCNC: 145 MG/DL
MCH RBC QN AUTO: 30.9 PG (ref 27–33)
MCHC RBC AUTO-ENTMCNC: 34.1 G/DL (ref 33.6–35)
MCV RBC AUTO: 90.8 FL (ref 81.4–97.8)
PLATELET # BLD AUTO: 249 K/UL (ref 164–446)
PMV BLD AUTO: 9.4 FL (ref 9–12.9)
POTASSIUM SERPL-SCNC: 4.1 MMOL/L (ref 3.6–5.5)
RBC # BLD AUTO: 4.33 M/UL (ref 4.2–5.4)
SODIUM SERPL-SCNC: 141 MMOL/L (ref 135–145)
TRIGL SERPL-MCNC: 68 MG/DL (ref 0–149)
WBC # BLD AUTO: 5.4 K/UL (ref 4.8–10.8)

## 2017-01-04 PROCEDURE — 99217 PR OBSERVATION CARE DISCHARGE: CPT | Performed by: INTERNAL MEDICINE

## 2017-01-04 PROCEDURE — 80048 BASIC METABOLIC PNL TOTAL CA: CPT

## 2017-01-04 PROCEDURE — 700105 HCHG RX REV CODE 258: Performed by: EMERGENCY MEDICINE

## 2017-01-04 PROCEDURE — 700111 HCHG RX REV CODE 636 W/ 250 OVERRIDE (IP)

## 2017-01-04 PROCEDURE — 85027 COMPLETE CBC AUTOMATED: CPT

## 2017-01-04 PROCEDURE — 80061 LIPID PANEL: CPT

## 2017-01-04 PROCEDURE — G0378 HOSPITAL OBSERVATION PER HR: HCPCS

## 2017-01-04 PROCEDURE — A9270 NON-COVERED ITEM OR SERVICE: HCPCS | Performed by: INTERNAL MEDICINE

## 2017-01-04 PROCEDURE — 700102 HCHG RX REV CODE 250 W/ 637 OVERRIDE(OP): Performed by: INTERNAL MEDICINE

## 2017-01-04 PROCEDURE — A9502 TC99M TETROFOSMIN: HCPCS

## 2017-01-04 RX ORDER — AMINOPHYLLINE 25 MG/ML
INJECTION, SOLUTION INTRAVENOUS
Status: COMPLETED
Start: 2017-01-04 | End: 2017-01-04

## 2017-01-04 RX ORDER — REGADENOSON 0.08 MG/ML
INJECTION, SOLUTION INTRAVENOUS
Status: COMPLETED
Start: 2017-01-04 | End: 2017-01-04

## 2017-01-04 RX ORDER — ASPIRIN 81 MG/1
81 TABLET ORAL DAILY
Qty: 30 TAB | Refills: 0 | Status: SHIPPED | OUTPATIENT
Start: 2017-01-04 | End: 2021-02-10

## 2017-01-04 RX ADMIN — REGADENOSON 0.4 MG: 0.08 INJECTION, SOLUTION INTRAVENOUS at 09:13

## 2017-01-04 RX ADMIN — ACETAMINOPHEN 650 MG: 325 TABLET, FILM COATED ORAL at 07:36

## 2017-01-04 RX ADMIN — ASPIRIN 81 MG: 81 TABLET, COATED ORAL at 07:36

## 2017-01-04 RX ADMIN — AMINOPHYLLINE 100 MG: 25 INJECTION, SOLUTION INTRAVENOUS at 09:21

## 2017-01-04 RX ADMIN — SUMATRIPTAN SUCCINATE 100 MG: 25 TABLET, FILM COATED ORAL at 07:39

## 2017-01-04 RX ADMIN — SODIUM CHLORIDE: 9 INJECTION, SOLUTION INTRAVENOUS at 05:49

## 2017-01-04 ASSESSMENT — PAIN SCALES - GENERAL: PAINLEVEL_OUTOF10: 8

## 2017-01-04 NOTE — DISCHARGE SUMMARY
CHIEF COMPLAINT ON ADMISSION  Chief Complaint   Patient presents with   • Chest Pain       CODE STATUS  Full Code    HPI & HOSPITAL COURSE  This is a 61 y.o. year old female here for chest pain evaluation. Patient was admitted to telemetry and no acute events overnight. Patient's troponin X 2 were negative. She was noted to have elevated LDL and statin therapy was recommended. Patient refused and stated she will follow up with her PCP and then decide at that point. Her nuclear stress test did not show reversible ischemia. She was chest pain free at time of discharge.      Therefore, she is discharged in good and stable condition with close outpatient follow-up.    SPECIFIC OUTPATIENT FOLLOW-UP  PCP in 1-2 weeks.     DISCHARGE PROBLEM LIST  Active Problems:    * No active hospital problems. *  Resolved Problems:    Chest pain POA: Unknown      FOLLOW UP  No future appointments.  No follow-up provider specified.    MEDICATIONS ON DISCHARGE   Chloé Cummings   Home Medication Instructions YASHIRA:[id number]    Printed on:01/04/17 3431   Medication Information                      aspirin EC 81 MG EC tablet  Take 1 Tab by mouth every day.             ibuprofen (MOTRIN) 200 MG Tab  Take 800 mg by mouth every 6 hours as needed for Mild Pain.             lamotrigine (LAMICTAL) 200 MG tablet  Take 200 mg by mouth 2 times a day.             omeprazole (PRILOSEC) 20 MG delayed-release capsule  Take 20 mg by mouth every day.             sumatriptan (IMITREX) 100 MG tablet  Take 100 mg by mouth Once PRN for Migraine.                 DIET  Orders Placed This Encounter   Procedures   • DIET ORDER     Standing Status: Standing      Number of Occurrences: 1      Standing Expiration Date:      Order Specific Question:  Diet:     Answer:  Regular [1]     Order Specific Question:  Miscellaneous modifications:     Answer:  Vegetarian [13]       ACTIVITY  As tolerated.      CONSULTATIONS  None     PROCEDURES  None     LABORATORY  Lab Results    Component Value Date/Time    SODIUM 141 01/04/2017 03:36 AM    POTASSIUM 4.1 01/04/2017 03:36 AM    CHLORIDE 109 01/04/2017 03:36 AM    CO2 25 01/04/2017 03:36 AM    GLUCOSE 92 01/04/2017 03:36 AM    BUN 7* 01/04/2017 03:36 AM    CREATININE 0.74 01/04/2017 03:36 AM        Lab Results   Component Value Date/Time    WBC 5.4 01/04/2017 03:36 AM    HEMOGLOBIN 13.4 01/04/2017 03:36 AM    HEMATOCRIT 39.3 01/04/2017 03:36 AM    PLATELET COUNT 249 01/04/2017 03:36 AM        Total time of the discharge process exceeds 38 minutes.

## 2017-01-04 NOTE — PROGRESS NOTES
1940: Pt assessed and medicated per MD order. Pt refusing statin. New IV placed 20g in L forearm x1 attempt. Pt denies c/p or any other c/o this pm.   2215: Pt sleeping with no s/s of distress.   2245: Lab at the bedside to draw third trop.  0200: Pt sleeping with no s/s of distress.   0445: Pt sleeping with no s/s of distress.   0545: Pt up to the restroom. No further needs at this time. New IVF bag hung  0640: Report given to Ari

## 2017-01-04 NOTE — DISCHARGE PLANNING
Medical Social Work    Referral: Pt discussed at IDT rounds this AM.    Intervention: Per flowsheet, pt lives alone and expects to d.c home.  If stress test is negative, then pt is a probable d.c today.  No SS needs identified nor any requests for KELSEY Bearden during rounds.      Plan: SW available for any assistance with d.c planning.

## 2017-01-04 NOTE — PROGRESS NOTES
Report received from Ari ITAN. Pt A&Ox 4, c/o headache. Fall precautions in place with call light in reach. POC discussed. All questions and concerns addressed.

## 2017-01-04 NOTE — CARE PLAN
Problem: Knowledge Deficit  Goal: Knowledge of disease process/condition, treatment plan, diagnostic tests, and medications will improve  Outcome: PROGRESSING AS EXPECTED  Diagnosis and POC discussed to include NPO status at midnight and what to expect with stress test. All questions and concerns addressed. Pt verbalized understanding.     Problem: Pain Management  Goal: Pain level will decrease to patient’s comfort goal  Outcome: PROGRESSING AS EXPECTED  0-10 pain scale used to assess and monitor pt pain level. Pain level slowly decreasing. Pt medicated accordingly.

## 2017-01-04 NOTE — PROGRESS NOTES
Nursing care plan includes knowledge deficit, potential for discomfort, potential for compromised cardiac output.  POC includes teaching, comfort measures and reassurance, and access to code cart, cardiology stand by and availability of rapid response team.  Pt verbalizes good understanding of benefits and risks of pharmacological cardiac stress test.  Informed consent obtained.  Lexiscan given, pt developed the following signs/symptoms:abdominal pressure, sob, then nausea developed after test completed..  Reversed with Aminophylline per protocol.  VS stable, symptoms resolved.  To waiting room, fluids and/or snack given, awaiting second scan.  Nursing goals met.

## 2017-01-04 NOTE — PROGRESS NOTES
Report received from ramu Durbin resting during report.  Checked the schedule when it came to the floor at 0730 and she is scheduled for 0800.  Morning medications and assessment completed about 0735.  Pt states her chest pain is resolved but her head is pounding.  She states she normally wakes up with a headache but it goes away when she sits up for awhile but today its becoming worse.  VSS.  Pt npo minus the water with her medications.  Reviewed her labs with her letting her know about her lipid panel.  Pt left for stress test about 0755 after voiding.

## 2017-01-04 NOTE — DISCHARGE INSTRUCTIONS
Discharge Instructions    Discharged to home by car with self. Discharged via walking, hospital escort: Yes.  Special equipment needed: Not Applicable    Be sure to schedule a follow-up appointment with your primary care doctor or any specialists as instructed.     Discharge Plan:   Diet Plan: Discussed  Activity Level: Discussed  Confirmed Follow up Appointment: Patient to Call and Schedule Appointment  Confirmed Symptoms Management: Discussed  Medication Reconciliation Updated: Yes  Influenza Vaccine Indication: Not indicated: Previously immunized this influenza season and > 8 years of age    I understand that a diet low in cholesterol, fat, and sodium is recommended for good health. Unless I have been given specific instructions below for another diet, I accept this instruction as my diet prescription.   Other diet: keep yourself hydrated.    Special Instructions:      Nonspecific Chest Pain  It is often hard to find the cause of chest pain. There is always a chance that your pain could be related to something serious, such as a heart attack or a blood clot in your lungs. Chest pain can also be caused by conditions that are not life-threatening. If you have chest pain, it is very important to follow up with your doctor.    HOME CARE  · If you were prescribed an antibiotic medicine, finish it all even if you start to feel better.  · Avoid any activities that cause chest pain.  · Do not use any tobacco products, including cigarettes, chewing tobacco, or electronic cigarettes. If you need help quitting, ask your doctor.  · Do not drink alcohol.  · Take medicines only as told by your doctor.  · Keep all follow-up visits as told by your doctor. This is important. This includes any further testing if your chest pain does not go away.  · Your doctor may tell you to keep your head raised (elevated) while you sleep.  · Make lifestyle changes as told by your doctor. These may include:  · Getting regular exercise. Ask your  doctor to suggest some activities that are safe for you.  · Eating a heart-healthy diet. Your doctor or a diet specialist (dietitian) can help you to learn healthy eating options.  · Maintaining a healthy weight.  · Managing diabetes, if necessary.  · Reducing stress.  GET HELP IF:  · Your chest pain does not go away, even after treatment.  · You have a rash with blisters on your chest.  · You have a fever.  GET HELP RIGHT AWAY IF:  · Your chest pain is worse.  · You have an increasing cough, or you cough up blood.  · You have severe belly (abdominal) pain.  · You feel extremely weak.  · You pass out (faint).  · You have chills.  · You have sudden, unexplained chest discomfort.  · You have sudden, unexplained discomfort in your arms, back, neck, or jaw.  · You have shortness of breath at any time.  · You suddenly start to sweat, or your skin gets clammy.  · You feel nauseous.  · You vomit.  · You suddenly feel light-headed or dizzy.  · Your heart begins to beat quickly, or it feels like it is skipping beats.  These symptoms may be an emergency. Do not wait to see if the symptoms will go away. Get medical help right away. Call your local emergency services (911 in the [place] Do not drive yourself to the hospital.     This information is not intended to replace advice given to you by your health care provider. Make sure you discuss any questions you have with your health care provider.     Document Released: 06/05/2009 Document Revised: 01/08/2016 Document Reviewed: 07/24/2015  [employer] Interactive Patient Education ©2016 [employer]        · Is patient discharged on Warfarin / Coumadin?   No     · Is patient Post Blood Transfusion?  No    Depression / Suicide Risk    As you are discharged from this [facility] facility, it is important to learn how to keep safe from harming yourself.    Recognize the warning signs:  · Abrupt changes in personality, positive or negative- including increase in energy   · Giving  away possessions  · Change in eating patterns- significant weight changes-  positive or negative  · Change in sleeping patterns- unable to sleep or sleeping all the time   · Unwillingness or inability to communicate  · Depression  · Unusual sadness, discouragement and loneliness  · Talk of wanting to die  · Neglect of personal appearance   · Rebelliousness- reckless behavior  · Withdrawal from people/activities they love  · Confusion- inability to concentrate     If you or a loved one observes any of these behaviors or has concerns about self-harm, here's what you can do:  · Talk about it- your feelings and reasons for harming yourself  · Remove any means that you might use to hurt yourself (examples: pills, rope, extension cords, firearm)  · Get professional help from the community (Mental Health, Substance Abuse, psychological counseling)  · Do not be alone:Call your Safe Contact- someone whom you trust who will be there for you.  · Call your local CRISIS HOTLINE 971-2852 or 284-379-8077  · Call your local Children's Mobile Crisis Response Team [place] (884) 693-4888 or www.FastSpring  · Call the toll free National Suicide Prevention Hotlines   · National Suicide Prevention Lifeline 600-011-HZUE (5593)  · National Hope Line Network 800-SUICIDE (243-2749)

## 2017-01-04 NOTE — PROGRESS NOTES
Tele summary  Rhythm: SR/ SB  Rate: 50's-80's  SC: 0.18  QRS: 0.08  QT: 0.38    Ectopy: none    Telemetry strips placed in pt chart.

## 2017-01-04 NOTE — PROGRESS NOTES
Pt reports her cp has resolved but her headache has come back.  Called kitchen for a second tray as pt is vegetarian.  Pt complaining her IV was hurting; tried repositioning but still complains of pain.  Removed IV and pt willing to let a new nurse try as she has really good veins.  VSS.    Tele Summary:    Strip at 1148 showed SR at 65.  Throughout the shift, pt had no ectopy.    Measurements from am strip were as follows:  CA=0.20  QRS=0.08  QT=0.40

## 2017-01-04 NOTE — PROGRESS NOTES
Pt returned from stress test about 1040. Early lunch tray arrived about 1130.  Pt states her headache is gone.  Dr. Collado rounded about 1145 and told her she would go home if her stress test was negative.  Results came back and received orders for discharge.  Pt verbalized understanding of making f/u appt, start taking asa, have a conversation with pcp about ldl and s/s of when to return back to ED.  Pt walked herself out.

## 2017-05-22 RX ORDER — ACYCLOVIR 400 MG/1
TABLET ORAL
Refills: 4 | OUTPATIENT
Start: 2017-05-22

## 2017-05-22 NOTE — TELEPHONE ENCOUNTER
Was the patient seen in the last year in this department? No     Does patient have an active prescription for medications requested? No     Received Request Via: Pharmacy     Last seen: 04/06/2016 Key

## 2017-05-22 NOTE — TELEPHONE ENCOUNTER
Refill declined.  Patient is being seen by Dr. Lyn.  Please have pharmacy contact new PCP and send prescription to them.  Tino Vanessa M.D.

## 2017-10-16 ENCOUNTER — OFFICE VISIT (OUTPATIENT)
Dept: URGENT CARE | Facility: CLINIC | Age: 62
End: 2017-10-16

## 2017-10-16 VITALS
BODY MASS INDEX: 20.89 KG/M2 | TEMPERATURE: 98.7 F | RESPIRATION RATE: 16 BRPM | HEIGHT: 66 IN | OXYGEN SATURATION: 96 % | SYSTOLIC BLOOD PRESSURE: 114 MMHG | DIASTOLIC BLOOD PRESSURE: 80 MMHG | HEART RATE: 101 BPM | WEIGHT: 130 LBS

## 2017-10-16 DIAGNOSIS — J22 LRTI (LOWER RESPIRATORY TRACT INFECTION): ICD-10-CM

## 2017-10-16 PROCEDURE — 99214 OFFICE O/P EST MOD 30 MIN: CPT | Performed by: FAMILY MEDICINE

## 2017-10-16 RX ORDER — AZITHROMYCIN 250 MG/1
TABLET, FILM COATED ORAL
Qty: 6 TAB | Refills: 0 | Status: SHIPPED | OUTPATIENT
Start: 2017-10-16 | End: 2019-08-27

## 2017-10-16 RX ORDER — ATOMOXETINE 10 MG/1
10 CAPSULE ORAL DAILY
COMMUNITY
End: 2019-11-08

## 2017-10-16 RX ORDER — CODEINE PHOSPHATE AND GUAIFENESIN 10; 100 MG/5ML; MG/5ML
5 SOLUTION ORAL EVERY 4 HOURS PRN
Qty: 90 ML | Refills: 0 | Status: SHIPPED | OUTPATIENT
Start: 2017-10-16 | End: 2019-08-27

## 2017-10-16 NOTE — PROGRESS NOTES
"CC:  cough        Cough  This is a new problem. The current episode started 1 week ago. The problem has been gradually worsening. The problem occurs constantly. The cough is productive of sputum. Associated symptoms include ear congestion, ear pain and nasal congestion. Pertinent negatives include no fever, headaches, sweats, weight loss or wheezing. Nothing aggravates the symptoms.  Patient has tried decongestant for the symptoms - minimal relief. There is no history of asthma.     Social History   Substance Use Topics   • Smoking status: Never Smoker   • Smokeless tobacco: Never Used   • Alcohol use No         Current Outpatient Prescriptions on File Prior to Visit   Medication Sig Dispense Refill   • lamotrigine (LAMICTAL) 200 MG tablet Take 200 mg by mouth 2 times a day.     • aspirin EC 81 MG EC tablet Take 1 Tab by mouth every day. 30 Tab 0   • sumatriptan (IMITREX) 100 MG tablet Take 100 mg by mouth Once PRN for Migraine.     • ibuprofen (MOTRIN) 200 MG Tab Take 800 mg by mouth every 6 hours as needed for Mild Pain.     • omeprazole (PRILOSEC) 20 MG delayed-release capsule Take 20 mg by mouth every day.       No current facility-administered medications on file prior to visit.                 Review of Systems   Constitutional: Negative for fever and weight loss.   HENT: negative for ear pain.    Cardiovascular - denies chest pain or dyspnea  Respiratory: Positive for cough.  Cough is productive.  Negative for wheezing.    Neurological: Negative for headaches.   GI - denies nausea, vomiting or diarrhea  Neuro - denies numbness or tingling.            Objective:     Blood pressure 114/80, pulse (!) 101, temperature 37.1 °C (98.7 °F), resp. rate 16, height 1.676 m (5' 6\"), weight 59 kg (130 lb), SpO2 96 %.    Physical Exam   Constitutional: patient is oriented to person, place, and time. Patient appears well-developed and well-nourished. No distress.   HENT:   Head: Normocephalic and atraumatic.   Right Ear: " External ear normal.   Left Ear: External ear normal.   Nose: Mucosal edema present. Right sinus exhibits no maxillary sinus tenderness. Left sinus exhibits no maxillary sinus tenderness.   Mouth/Throat: Mucous membranes are normal. No oral lesions. Posterior oropharyngeal erythema present. No oropharyngeal exudate or posterior oropharyngeal edema.   Eyes: Conjunctivae and EOM are normal. Pupils are equal, round, and reactive to light. Right eye exhibits no discharge. Left eye exhibits no discharge. No scleral icterus.   Neck: Normal range of motion. Neck supple. No tracheal deviation present.   Cardiovascular: Normal rate, regular rhythm and normal heart sounds.  Exam reveals no friction rub.    Pulmonary/Chest: Effort normal. No respiratory distress. Patient has no wheezes. Patient has rhonchi. Patient has no rales.    Musculoskeletal:  exhibits no edema.   Lymphadenopathy:     Patient has no cervical adenopathy  Neurological: patient is alert and oriented to person, place, and time.   Skin: Skin is warm and dry. No rash noted. No erythema.   Psychiatric: patient  has a normal mood and affect.  behavior is normal.   Nursing note and vitals reviewed.              Assessment/Plan:     1. LRTI (lower respiratory tract infection)        - azithromycin (ZITHROMAX) 250 MG Tab; Take as directed  Dispense: 6 Tab; Refill: 0  - guaifenesin-codeine (CHERATUSSIN AC) Solution oral solution; Take 5 mL by mouth every four hours as needed for Cough.  Dispense: 90 mL; Refill: 0    Follow up in one week if no improvement, sooner if symptoms worsen.

## 2019-08-22 ENCOUNTER — OFFICE VISIT (OUTPATIENT)
Dept: URGENT CARE | Facility: CLINIC | Age: 64
End: 2019-08-22

## 2019-08-22 ENCOUNTER — HOSPITAL ENCOUNTER (OUTPATIENT)
Facility: MEDICAL CENTER | Age: 64
End: 2019-08-22
Attending: PHYSICIAN ASSISTANT

## 2019-08-22 VITALS
HEART RATE: 75 BPM | RESPIRATION RATE: 18 BRPM | SYSTOLIC BLOOD PRESSURE: 110 MMHG | DIASTOLIC BLOOD PRESSURE: 78 MMHG | OXYGEN SATURATION: 97 % | HEIGHT: 66 IN | TEMPERATURE: 98.7 F | BODY MASS INDEX: 22.5 KG/M2 | WEIGHT: 140 LBS

## 2019-08-22 DIAGNOSIS — N89.8 VAGINAL DISCHARGE: ICD-10-CM

## 2019-08-22 DIAGNOSIS — N76.0 BACTERIAL VAGINOSIS: ICD-10-CM

## 2019-08-22 DIAGNOSIS — B96.89 BACTERIAL VAGINOSIS: ICD-10-CM

## 2019-08-22 LAB
APPEARANCE UR: CLEAR
BILIRUB UR STRIP-MCNC: NORMAL MG/DL
COLOR UR AUTO: NORMAL
GLUCOSE UR STRIP.AUTO-MCNC: NORMAL MG/DL
KETONES UR STRIP.AUTO-MCNC: NEGATIVE MG/DL
LEUKOCYTE ESTERASE UR QL STRIP.AUTO: NORMAL
NITRITE UR QL STRIP.AUTO: NEGATIVE
PH UR STRIP.AUTO: 7 [PH] (ref 5–8)
PROT UR QL STRIP: NORMAL MG/DL
RBC UR QL AUTO: NORMAL
SP GR UR STRIP.AUTO: 1.01
UROBILINOGEN UR STRIP-MCNC: 0.2 MG/DL

## 2019-08-22 PROCEDURE — 81002 URINALYSIS NONAUTO W/O SCOPE: CPT | Performed by: PHYSICIAN ASSISTANT

## 2019-08-22 PROCEDURE — 99214 OFFICE O/P EST MOD 30 MIN: CPT | Performed by: PHYSICIAN ASSISTANT

## 2019-08-22 PROCEDURE — 87660 TRICHOMONAS VAGIN DIR PROBE: CPT

## 2019-08-22 PROCEDURE — 87510 GARDNER VAG DNA DIR PROBE: CPT

## 2019-08-22 PROCEDURE — 87480 CANDIDA DNA DIR PROBE: CPT

## 2019-08-22 RX ORDER — BUPROPION HYDROCHLORIDE 150 MG/1
150 TABLET, EXTENDED RELEASE ORAL 2 TIMES DAILY
COMMUNITY
End: 2020-03-10 | Stop reason: SDUPTHER

## 2019-08-22 RX ORDER — METRONIDAZOLE 500 MG/1
500 TABLET ORAL
Qty: 14 TAB | Refills: 0 | Status: SHIPPED | OUTPATIENT
Start: 2019-08-22 | End: 2019-08-27

## 2019-08-22 ASSESSMENT — ENCOUNTER SYMPTOMS
VAGINITIS: 1
FEVER: 0
CONSTIPATION: 0
ABDOMINAL PAIN: 0
DIARRHEA: 0
EYE DISCHARGE: 0
VOMITING: 0
SORE THROAT: 0
EYE REDNESS: 0
NAUSEA: 0
SHORTNESS OF BREATH: 0
MYALGIAS: 0
COUGH: 0
HEADACHES: 0

## 2019-08-22 NOTE — PROGRESS NOTES
Subjective:      Chloé Cummings is a 64 y.o. female who presents with Yeast Infection (vaginal discharge,yellowish,no odor-several weeks)        Vaginitis   The patient's primary symptoms include vaginal discharge. The patient's pertinent negatives include no genital itching or genital odor. This is a new problem. Episode onset: x several weeks. The problem occurs constantly. The problem has been unchanged. The patient is experiencing no pain. Associated symptoms include frequency and urgency. Pertinent negatives include no abdominal pain, constipation, diarrhea, dysuria, fever, headaches, hematuria, nausea, rash, sore throat or vomiting. The vaginal discharge was yellow. There has been no bleeding. Nothing aggravates the symptoms. Treatments tried: OTC vaginal test kit - indicated the pH was abnormal. She is not sexually active.     PMH:  has a past medical history of Bipolar 2 disorder (HCC), Herpes genitalis in women, Insomnia, and Migraines.  MEDS:   Current Outpatient Medications:   •  buPROPion SR (WELLBUTRIN SR) 150 MG TABLET SR 12 HR sustained-release tablet, Take 150 mg by mouth 2 times a day., Disp: , Rfl:   •  atomoxetine (STRATTERA) 10 MG capsule, Take 10 mg by mouth every day., Disp: , Rfl:   •  azithromycin (ZITHROMAX) 250 MG Tab, Take as directed, Disp: 6 Tab, Rfl: 0  •  guaifenesin-codeine (CHERATUSSIN AC) Solution oral solution, Take 5 mL by mouth every four hours as needed for Cough., Disp: 90 mL, Rfl: 0  •  aspirin EC 81 MG EC tablet, Take 1 Tab by mouth every day., Disp: 30 Tab, Rfl: 0  •  sumatriptan (IMITREX) 100 MG tablet, Take 100 mg by mouth Once PRN for Migraine., Disp: , Rfl:   •  ibuprofen (MOTRIN) 200 MG Tab, Take 800 mg by mouth every 6 hours as needed for Mild Pain., Disp: , Rfl:   •  lamotrigine (LAMICTAL) 200 MG tablet, Take 200 mg by mouth 2 times a day., Disp: , Rfl:   •  omeprazole (PRILOSEC) 20 MG delayed-release capsule, Take 20 mg by mouth every day., Disp: , Rfl:   ALLERGIES:  "  Allergies   Allergen Reactions   • Nkda [No Known Drug Allergy]      SURGHX:   Past Surgical History:   Procedure Laterality Date   • CRANIOTOMY  2011    Meningioma   • ARTHROSCOPY, KNEE     • SHOULDER ARTHROSCOPY       SOCHX:  reports that she has never smoked. She has never used smokeless tobacco. She reports that she does not drink alcohol or use drugs.  FH: Family history was reviewed, no pertinent findings to report    Review of Systems   Constitutional: Negative for fever.   HENT: Negative for congestion, ear pain and sore throat.    Eyes: Negative for discharge and redness.   Respiratory: Negative for cough and shortness of breath.    Cardiovascular: Negative for chest pain and leg swelling.   Gastrointestinal: Negative for abdominal pain, constipation, diarrhea, nausea and vomiting.   Genitourinary: Positive for frequency, urgency and vaginal discharge. Negative for dysuria and hematuria.   Musculoskeletal: Negative for myalgias.   Skin: Negative for rash.   Neurological: Negative for headaches.   All other systems reviewed and are negative.         Objective:     /78 (BP Location: Left arm)   Pulse 75   Temp 37.1 °C (98.7 °F) (Temporal)   Resp 18   Ht 1.676 m (5' 6\")   Wt 63.5 kg (140 lb)   SpO2 97%   BMI 22.60 kg/m²      Physical Exam   Constitutional: She is oriented to person, place, and time. She appears well-developed and well-nourished. No distress.   HENT:   Head: Normocephalic and atraumatic.   Right Ear: External ear normal.   Left Ear: External ear normal.   Nose: Nose normal.   Mouth/Throat: Oropharynx is clear and moist.   Eyes: Conjunctivae and EOM are normal.   Neck: Normal range of motion. Neck supple.   Cardiovascular: Normal rate, regular rhythm and normal heart sounds.   Pulmonary/Chest: Effort normal and breath sounds normal.   Abdominal: Soft. There is no tenderness. There is no CVA tenderness.   Musculoskeletal: Normal range of motion.   Neurological: She is alert and " oriented to person, place, and time.   Skin: Skin is warm and dry.          Progress:  Results for orders placed or performed in visit on 08/22/19   POCT Urinalysis   Result Value Ref Range    POC Color yelow Negative    POC Appearance clear Negative    POC Leukocyte Esterase small Negative    POC Nitrites negative Negative    POC Urobiligen 0.2 Negative (0.2) mg/dL    POC Protein neg Negative mg/dL    POC Urine PH 7.0 5.0 - 8.0    POC Blood trace Negative    POC Specific Gravity 1.015 <1.005 - >1.030    POC Ketones negative Negative mg/dL    POC Bilirubin neg Negative mg/dL    POC Glucose neg Negative mg/dL     Vaginal Pathogens - pending     Assessment/Plan:     1. Vaginal discharge  - POCT Urinalysis  - VAGINAL PATHOGENS DNA PANEL; Future    2. Bacterial vaginosis  -Metronidazole (Flagyl) 500mg Tab sig: Take 1 Tab by mouth 2 Times a Day for 7 days    The patient's presenting symptoms and physical exam are consistent with vaginal discharge.  Will test the patient for vaginal pathogens to further assess the patient's symptoms.  The patient declined a pelvic exam today in clinic.  The patient's urinalysis today in clinic showed trace leukocyte esterace and trace blood. This is likely to to contamination given the patient's vaginal discharge.  Based on the patient's presenting symptoms, it is likely her vaginal discharge is due to Gardnerella. Will prescribe the patient Metronidazole for a presumed bacterial vaginosis infection.  Recommend the patient follow-up with her PCP.  Discussed return precautions with the patient, and she verbalized understanding.    OTC Tylenol or Motrin for fever/discomfort.  Drink plenty fluids  Follow-up with PCP  Return to clinic or go to the ED if symptoms worsen or fail to improve, or if the patient should develop worsening/increasing vaginal discharge, vaginal bleeding, urinary symptoms, pelvic pain, nausea/vomiting, fever/chills, and/or any concerning symptoms.    Discussed plan with  the patient, and she agrees to the above.

## 2019-08-23 LAB
AMBIGUOUS DTTM AMBI4: NORMAL
CANDIDA DNA VAG QL PROBE+SIG AMP: NEGATIVE
G VAGINALIS DNA VAG QL PROBE+SIG AMP: POSITIVE
SIGNIFICANT IND 70042: NORMAL
SITE SITE: NORMAL
SOURCE SOURCE: NORMAL
T VAGINALIS DNA VAG QL PROBE+SIG AMP: NEGATIVE

## 2019-08-27 ENCOUNTER — OFFICE VISIT (OUTPATIENT)
Dept: URGENT CARE | Facility: CLINIC | Age: 64
End: 2019-08-27

## 2019-08-27 VITALS
RESPIRATION RATE: 18 BRPM | WEIGHT: 140 LBS | BODY MASS INDEX: 23.32 KG/M2 | TEMPERATURE: 99.2 F | SYSTOLIC BLOOD PRESSURE: 102 MMHG | HEART RATE: 70 BPM | DIASTOLIC BLOOD PRESSURE: 78 MMHG | HEIGHT: 65 IN | OXYGEN SATURATION: 95 %

## 2019-08-27 DIAGNOSIS — R07.81 RIB PAIN ON LEFT SIDE: ICD-10-CM

## 2019-08-27 DIAGNOSIS — J01.10 ACUTE NON-RECURRENT FRONTAL SINUSITIS: Primary | ICD-10-CM

## 2019-08-27 PROCEDURE — 99214 OFFICE O/P EST MOD 30 MIN: CPT | Performed by: PHYSICIAN ASSISTANT

## 2019-08-27 RX ORDER — AMOXICILLIN AND CLAVULANATE POTASSIUM 875; 125 MG/1; MG/1
1 TABLET, FILM COATED ORAL 2 TIMES DAILY
Qty: 14 TAB | Refills: 0 | Status: SHIPPED | OUTPATIENT
Start: 2019-08-27 | End: 2019-09-03

## 2019-08-27 ASSESSMENT — ENCOUNTER SYMPTOMS
SHORTNESS OF BREATH: 0
SORE THROAT: 1
DIARRHEA: 0
VOMITING: 0
CHILLS: 0
COUGH: 0
HOARSE VOICE: 1
SINUS PRESSURE: 1
CONSTIPATION: 0
ABDOMINAL PAIN: 0
FEVER: 0
NAUSEA: 0

## 2019-08-27 NOTE — PATIENT INSTRUCTIONS
Rib Fracture  A rib fracture is a break or crack in one of the bones of the ribs. The ribs are like a cage that goes around your upper chest. A broken or cracked rib is often painful, but most do not cause other problems. Most rib fractures heal on their own in 1-3 months.  Follow these instructions at home:  · Avoid activities that cause pain to the injured area. Protect your injured area.  · Slowly increase activity as told by your doctor.  · Take medicine as told by your doctor.  · Put ice on the injured area for the first 1-2 days after you have been treated or as told by your doctor.  ¨ Put ice in a plastic bag.  ¨ Place a towel between your skin and the bag.  ¨ Leave the ice on for 15-20 minutes at a time, every 2 hours while you are awake.  · Do deep breathing as told by your doctor. You may be told to:  ¨ Take deep breaths many times a day.  ¨ Cough many times a day while hugging a pillow.  ¨ Use a device (incentive spirometer) to perform deep breathing many times a day.  · Drink enough fluids to keep your pee (urine) clear or pale yellow.  · Do not wear a rib belt or binder. These do not allow you to breathe deeply.  Get help right away if:  · You have a fever.  · You have trouble breathing.  · You cannot stop coughing.  · You cough up thick or bloody spit (mucus).  · You feel sick to your stomach (nauseous), throw up (vomit), or have belly (abdominal) pain.  · Your pain gets worse and medicine does not help.  This information is not intended to replace advice given to you by your health care provider. Make sure you discuss any questions you have with your health care provider.  Document Released: 09/26/2009 Document Revised: 05/25/2017 Document Reviewed: 02/19/2014  [employer] Interactive Patient Education © 2017 [employer]

## 2019-08-27 NOTE — PROGRESS NOTES
Subjective:   Chloé Cummings is a 64 y.o. female who presents for Sinusitis (nasal drainage,ear pressure,headache,sore throat-x4days) and Rib Injury (x pulling something,stabbing pain-(L) side-x4 days)        Sinusitis   This is a new problem. Episode onset: 4 days. The problem is unchanged. There has been no fever. Associated symptoms include congestion, a hoarse voice, sinus pressure, sneezing and a sore throat. Pertinent negatives include no chills, coughing, ear pain or shortness of breath. Treatments tried: nyquil      The patient injured ribs while moving a patient.  She felt the headboard hit the left anterior ribs.  The pain has been worsening.  No previous injury.  No shortness of breath.    Review of Systems   Constitutional: Negative for chills, fever and malaise/fatigue.   HENT: Positive for congestion, hoarse voice, sinus pressure, sneezing and sore throat. Negative for ear pain.    Respiratory: Negative for cough and shortness of breath.    Gastrointestinal: Negative for abdominal pain, constipation, diarrhea, nausea and vomiting.   Musculoskeletal:        Pos rib pain   All other systems reviewed and are negative.      PMH:  has a past medical history of Bipolar 2 disorder (HCC), Herpes genitalis in women, Insomnia, and Migraines.  MEDS:   Current Outpatient Medications:   •  amoxicillin-clavulanate (AUGMENTIN) 875-125 MG Tab, Take 1 Tab by mouth 2 times a day for 7 days., Disp: 14 Tab, Rfl: 0  •  buPROPion SR (WELLBUTRIN SR) 150 MG TABLET SR 12 HR sustained-release tablet, Take 150 mg by mouth 2 times a day., Disp: , Rfl:   •  atomoxetine (STRATTERA) 10 MG capsule, Take 10 mg by mouth every day., Disp: , Rfl:   •  aspirin EC 81 MG EC tablet, Take 1 Tab by mouth every day., Disp: 30 Tab, Rfl: 0  •  sumatriptan (IMITREX) 100 MG tablet, Take 100 mg by mouth Once PRN for Migraine., Disp: , Rfl:   •  ibuprofen (MOTRIN) 200 MG Tab, Take 800 mg by mouth every 6 hours as needed for Mild Pain., Disp: , Rfl:   •  " lamotrigine (LAMICTAL) 200 MG tablet, Take 200 mg by mouth 2 times a day., Disp: , Rfl:   •  omeprazole (PRILOSEC) 20 MG delayed-release capsule, Take 20 mg by mouth every day., Disp: , Rfl:   ALLERGIES:   Allergies   Allergen Reactions   • Nkda [No Known Drug Allergy]      SURGHX:   Past Surgical History:   Procedure Laterality Date   • CRANIOTOMY  2011    Meningioma   • ARTHROSCOPY, KNEE     • SHOULDER ARTHROSCOPY       SOCHX:  reports that she has never smoked. She has never used smokeless tobacco. She reports that she does not drink alcohol or use drugs.  Family History   Problem Relation Age of Onset   • Stroke Mother    • Heart Attack Mother    • Cancer Father         Lung   • Stroke Sister    • Heart Attack Maternal Grandmother         Objective:   /78 (BP Location: Left arm)   Pulse 70   Temp 37.3 °C (99.2 °F) (Temporal)   Resp 18   Ht 1.651 m (5' 5\")   Wt 63.5 kg (140 lb)   SpO2 95%   BMI 23.30 kg/m²     Physical Exam   Constitutional: She is oriented to person, place, and time. She appears well-developed and well-nourished. No distress.   HENT:   Head: Normocephalic and atraumatic.   Right Ear: Tympanic membrane and ear canal normal.   Left Ear: Tympanic membrane and ear canal normal.   Nose: Mucosal edema and rhinorrhea present.   Mouth/Throat: Uvula is midline and mucous membranes are normal. Posterior oropharyngeal erythema present.   Eyes: Pupils are equal, round, and reactive to light. Conjunctivae are normal.   Neck: Normal range of motion. Neck supple. No tracheal deviation present.   Cardiovascular: Normal rate and regular rhythm.   Pulmonary/Chest: Effort normal and breath sounds normal. No stridor. No respiratory distress. She has no wheezes. She has no rales.       Lymphadenopathy:     She has no cervical adenopathy.   Neurological: She is alert and oriented to person, place, and time.   Skin: Skin is warm and dry. Capillary refill takes less than 2 seconds.   Psychiatric: She has " a normal mood and affect. Her behavior is normal.   Vitals reviewed.        Assessment/Plan:     1. Acute non-recurrent frontal sinusitis  amoxicillin-clavulanate (AUGMENTIN) 875-125 MG Tab   2. Rib pain on left side       We discussed sx are most commonly due to viral etiology. Supportive care reviewed. Patient was given a contingent antibiotic prescription to fill and use as directed if symptoms progressed as discussed and agreed upon.  Sinusitis handout provided.    The patient is currently uninsured and cost is an issue.  She would like to defer rib x-ray.  She can contact me if symptoms persist or worsen and I will place an outpatient order for a rib x-ray.  Rib fracture handout provided.    Follow-up with primary care provider within 7-10 days.  If symptoms worsen or persist patient can return to clinic for reevaluation.  Red flags and emergency room precautions discussed. All side effects of medication discussed including allergic response, GI upset, tendon injury, etc. Patient verbalized understanding of information.    Please note that this dictation was created using voice recognition software. I have made every reasonable attempt to correct obvious errors, but I expect that there are errors of grammar and possibly content that I did not discover before finalizing the note.

## 2019-11-08 ENCOUNTER — OFFICE VISIT (OUTPATIENT)
Dept: MEDICAL GROUP | Facility: MEDICAL CENTER | Age: 64
End: 2019-11-08
Payer: MEDICARE

## 2019-11-08 VITALS
HEART RATE: 87 BPM | BODY MASS INDEX: 24.16 KG/M2 | HEIGHT: 65 IN | DIASTOLIC BLOOD PRESSURE: 76 MMHG | TEMPERATURE: 98.2 F | OXYGEN SATURATION: 95 % | WEIGHT: 145 LBS | SYSTOLIC BLOOD PRESSURE: 128 MMHG | RESPIRATION RATE: 12 BRPM

## 2019-11-08 DIAGNOSIS — I47.10 PAROXYSMAL SVT (SUPRAVENTRICULAR TACHYCARDIA): ICD-10-CM

## 2019-11-08 DIAGNOSIS — Z12.39 BREAST CANCER SCREENING: ICD-10-CM

## 2019-11-08 DIAGNOSIS — Z78.0 ASYMPTOMATIC POSTMENOPAUSAL STATE: ICD-10-CM

## 2019-11-08 DIAGNOSIS — Z23 NEED FOR VACCINATION: ICD-10-CM

## 2019-11-08 DIAGNOSIS — Z00.00 HEALTHCARE MAINTENANCE: ICD-10-CM

## 2019-11-08 DIAGNOSIS — F31.81 BIPOLAR 2 DISORDER (HCC): ICD-10-CM

## 2019-11-08 DIAGNOSIS — E78.2 MIXED HYPERLIPIDEMIA: ICD-10-CM

## 2019-11-08 PROCEDURE — 90732 PPSV23 VACC 2 YRS+ SUBQ/IM: CPT | Performed by: FAMILY MEDICINE

## 2019-11-08 PROCEDURE — G0008 ADMIN INFLUENZA VIRUS VAC: HCPCS | Performed by: FAMILY MEDICINE

## 2019-11-08 PROCEDURE — 90662 IIV NO PRSV INCREASED AG IM: CPT | Performed by: FAMILY MEDICINE

## 2019-11-08 PROCEDURE — G0009 ADMIN PNEUMOCOCCAL VACCINE: HCPCS | Performed by: FAMILY MEDICINE

## 2019-11-08 PROCEDURE — 99204 OFFICE O/P NEW MOD 45 MIN: CPT | Mod: 25 | Performed by: FAMILY MEDICINE

## 2019-11-08 NOTE — PROGRESS NOTES
CC: New patient: Bipolar disorder, hyperlipidemia, SVT    HPI:  Chloé presents today to establish new PCP.    Patient has been active and independent with all ADLs.  She is a retired nurse.  At the following chronic medical issues:    Bipolar 2 disorder (HCC)  She mostly on the depression side of bipolar disorder.  However she has been stable, currently asymptomatic.  Patient has been doing fine on Wellbutrin 150 mg twice a day.  No side effects, no suicidal ideation.    Mixed hyperlipidemia  Last lipid panel was checked in 2017 was:   Ref Range & Units 2yr ago   Cholesterol,Tot 100 - 199 mg/dL 247High     Triglycerides 0 - 149 mg/dL 68    HDL >=40 mg/dL 88    LDL <100 mg/dL 145High      Currently not on medication.  No history of diabetes, stroke, CAD.    Paroxysmal SVT (supraventricular tachycardia) (HCC)  Patient is currently asymptomatic.  She had 4 last episodes was few weeks ago, never hospitalized for it.  She has been following up with cardiology.  Not on medication.    Due for mammogram, has never had bone density scan.  Due for flu shot and pneumonia shot.  She has had colonoscopy in 2017 was found to have 4 polyps, recommended to repeat colonoscopy in 5 years.  Patient Active Problem List    Diagnosis Date Noted   • Stress at work 04/06/2015   • Atrophic vaginitis 04/06/2015   • Post menopausal syndrome 04/06/2015   • Memory difficulty 09/17/2014   • Bipolar 2 disorder (HCC) 01/27/2014   • Insomnia 01/27/2014   • Herpes genitalis in women 01/27/2014   • Migraine headache 01/27/2014       Current Outpatient Medications   Medication Sig Dispense Refill   • buPROPion SR (WELLBUTRIN SR) 150 MG TABLET SR 12 HR sustained-release tablet Take 150 mg by mouth 2 times a day.     • ibuprofen (MOTRIN) 200 MG Tab Take 800 mg by mouth every 6 hours as needed for Mild Pain.     • aspirin EC 81 MG EC tablet Take 1 Tab by mouth every day. (Patient not taking: Reported on 11/8/2019) 30 Tab 0     No current  facility-administered medications for this visit.          Allergies as of 11/08/2019 - Reviewed 11/08/2019   Allergen Reaction Noted   • Nkda [no known drug allergy]  02/26/2014        Social History     Socioeconomic History   • Marital status: Single     Spouse name: Not on file   • Number of children: Not on file   • Years of education: Not on file   • Highest education level: Not on file   Occupational History   • Not on file   Social Needs   • Financial resource strain: Not on file   • Food insecurity:     Worry: Not on file     Inability: Not on file   • Transportation needs:     Medical: Not on file     Non-medical: Not on file   Tobacco Use   • Smoking status: Never Smoker   • Smokeless tobacco: Never Used   Substance and Sexual Activity   • Alcohol use: No   • Drug use: No   • Sexual activity: Not Currently   Lifestyle   • Physical activity:     Days per week: Not on file     Minutes per session: Not on file   • Stress: Not on file   Relationships   • Social connections:     Talks on phone: Not on file     Gets together: Not on file     Attends [employer] service: Not on file     Active member of club or organization: Not on file     Attends meetings of clubs or organizations: Not on file     Relationship status: Not on file   • Intimate partner violence:     Fear of current or ex partner: Not on file     Emotionally abused: Not on file     Physically abused: Not on file     Forced sexual activity: Not on file   Other Topics Concern   • Not on file   Social History Narrative   • Not on file       Family History   Problem Relation Age of Onset   • Stroke Mother    • Heart Attack Mother    • Cancer Father         Lung   • Stroke Sister    • Heart Attack Maternal Grandmother        Past Surgical History:   Procedure Laterality Date   • CRANIOTOMY  2011    Meningioma   • ARTHROSCOPY, KNEE     • SHOULDER ARTHROSCOPY         ROS:  Denies any Headache, Blurred Vision, Confusion Chest pain,  Shortness of breath,   "Abdominal pain, Changes of bowel or bladder, Lower ext edema, Fevers, Nights sweats, Weight Changes, Focal weakness or numbness.  All other systems are negative.    /76 (BP Location: Left arm, Patient Position: Sitting)   Pulse 87   Temp 36.8 °C (98.2 °F) (Temporal)   Resp 12   Ht 1.651 m (5' 5\")   Wt 65.8 kg (145 lb)   SpO2 95%   BMI 24.13 kg/m²     Physical Exam:  Gen:         Alert and oriented, No apparent distress.  HEENT:   Perrla, TM clear,  Oralpharynx no erythema or exudates.  Neck:       No Jugular venous distension, Lymphadenopathy, Thyromegaly, Bruits.  Lungs:     Clear to auscultation bilaterally  CV:          Regular rate and rhythm. No murmurs, rubs or gallops.  Abd:         Soft non tender, non distended. Normal active bowel sounds. No                                        Hepatosplenomegaly, No pulsatile masses.  Ext:          No clubbing, cyanosis, edema.      Assessment and Plan.   64 y.o. female     1. Bipolar 2 disorder (HCC)  She mostly on the depression side of bipolar disorder.  However she has been stable, currently asymptomatic.  Continue Wellbutrin 150 mg twice a day.    - CBC WITH DIFFERENTIAL; Future  - Comp Metabolic Panel; Future  - TSH; Future    2. Mixed hyperlipidemia  Last lipid panel was checked in 2017 was:   Ref Range & Units 2yr ago   Cholesterol,Tot 100 - 199 mg/dL 247High     Triglycerides 0 - 149 mg/dL 68    HDL >=40 mg/dL 88    LDL <100 mg/dL 145High        Patient is counseled about lifestyle modification.    - Lipid Profile; Future  - TSH; Future    3. Paroxysmal SVT (supraventricular tachycardia) (HCC)  Currently asymptomatic.  Last episodes was few weeks ago, never hospitalized for it.  She has been following up with cardiology.    4. Breast cancer screening  Due for mammogram.    - MA-SCREEN MAMMO W/CAD-BILAT; Future    5. Asymptomatic postmenopausal state  Has never had bone density 4.    - DS-BONE DENSITY STUDY (DEXA); Future    6. Healthcare " maintenance  Due for mammogram, has never had bone density scan.  Due for flu shot and pneumonia shot.  Given today.  She has had colonoscopy in 2017 was found to have 4 polyps, recommended to repeat colonoscopy in 5 years.    7. Need for vaccination    - INFLUENZA VACCINE, HIGH DOSE (65+ ONLY)  - Pneumococal Polysaccharide Vaccine 23-Valent =>1YO SQ/IM

## 2020-01-22 ENCOUNTER — OFFICE VISIT (OUTPATIENT)
Dept: MEDICAL GROUP | Facility: MEDICAL CENTER | Age: 65
End: 2020-01-22
Payer: MEDICARE

## 2020-01-22 VITALS
BODY MASS INDEX: 24.22 KG/M2 | WEIGHT: 145.4 LBS | OXYGEN SATURATION: 95 % | TEMPERATURE: 98.3 F | HEART RATE: 76 BPM | RESPIRATION RATE: 16 BRPM | HEIGHT: 65 IN | DIASTOLIC BLOOD PRESSURE: 64 MMHG | SYSTOLIC BLOOD PRESSURE: 112 MMHG

## 2020-01-22 DIAGNOSIS — R05.9 COUGH: ICD-10-CM

## 2020-01-22 DIAGNOSIS — R07.0 THROAT PAIN: ICD-10-CM

## 2020-01-22 PROCEDURE — 99213 OFFICE O/P EST LOW 20 MIN: CPT | Performed by: FAMILY MEDICINE

## 2020-01-23 NOTE — PROGRESS NOTES
"CC: Cough/throat pain x5 days    HPI:   Chloé presents today because she has been having dry cough and throat pain for 5 days.  This condition gets worse 2 days ago and since then she has been getting better.  Denies any fever or chills.  Has been having some body ache which is now have improved.  She has been having problem swallowing, now has improved as well.  Patient has been taking over-the-counter stuff for common cold and has been helping.  Denies any chest pain, or shortness of breath.  Has been having normal oxygen saturation.  Patient stated that generally her condition has improved a lot since yesterday.      Patient Active Problem List    Diagnosis Date Noted   • Stress at work 04/06/2015   • Atrophic vaginitis 04/06/2015   • Post menopausal syndrome 04/06/2015   • Memory difficulty 09/17/2014   • Bipolar 2 disorder (HCC) 01/27/2014   • Insomnia 01/27/2014   • Herpes genitalis in women 01/27/2014   • Migraine headache 01/27/2014       Current Outpatient Medications   Medication Sig Dispense Refill   • buPROPion SR (WELLBUTRIN SR) 150 MG TABLET SR 12 HR sustained-release tablet Take 150 mg by mouth 2 times a day.     • aspirin EC 81 MG EC tablet Take 1 Tab by mouth every day. (Patient not taking: Reported on 11/8/2019) 30 Tab 0   • ibuprofen (MOTRIN) 200 MG Tab Take 800 mg by mouth every 6 hours as needed for Mild Pain.       No current facility-administered medications for this visit.          Allergies as of 01/22/2020 - Reviewed 01/22/2020   Allergen Reaction Noted   • Nkda [no known drug allergy]  02/26/2014        ROS: Denies any chest pain, Shortness of breath, Changes bowel or bladder, Lower extremity edema.    Physical Exam:  /64 (BP Location: Left arm)   Pulse 76   Temp 36.8 °C (98.3 °F) (Temporal)   Resp 16   Ht 1.651 m (5' 5\")   Wt 66 kg (145 lb 6.4 oz)   SpO2 95%   BMI 24.20 kg/m²   Gen.: Well-developed, well-nourished, no apparent distress,pleasant and cooperative with the " examination  Skin:  Warm and dry with good turgor. No rashes or suspicious lesions in visible areas  HEENT:Sinuses nontender with palpation, TMs clear, nares patent with pink mucosa and clear rhinorrhea,no septal deviation ,polyps or lesions. lips without lesions, oropharynx showed mild redness but no exudate.  Neck: Trachea midline,no masses or adenopathy.   Cor: Regular rate and rhythm without murmur, gallop or rub.  Lungs: Respirations unlabored.Clear to auscultation with equal breath sounds bilaterally. No wheezes, rhonchi.        Assessment and Plan.   64 y.o. female     1. Cough/throat pain  Probably a viral URI.  I recommend supportive treatment as follows:  Drink plenty of fluids to keep yourself hydrated. Warm fluids like tea and hot soup are better.  Increase humidity in the house with a humidifier or place your head over a steaming pot.-  Use Tylenol or Motrin for aches, pains, or fever.  Get plenty of rest to allow your body time to heal.  Use OTC cough suppression.  Try OTC normal saline nasal spray for nasal congestion.  Contact the office or return for appt. if you develop worsening symptoms or do not improve in the next week.  Avoid airway irritants such as tobacco smoke or blowing dust.

## 2020-02-04 ENCOUNTER — OFFICE VISIT (OUTPATIENT)
Dept: MEDICAL GROUP | Facility: MEDICAL CENTER | Age: 65
End: 2020-02-04
Payer: MEDICARE

## 2020-02-04 VITALS
DIASTOLIC BLOOD PRESSURE: 78 MMHG | OXYGEN SATURATION: 98 % | HEART RATE: 79 BPM | TEMPERATURE: 98.8 F | SYSTOLIC BLOOD PRESSURE: 126 MMHG | HEIGHT: 65 IN | RESPIRATION RATE: 16 BRPM | BODY MASS INDEX: 24.16 KG/M2 | WEIGHT: 145 LBS

## 2020-02-04 DIAGNOSIS — R05.9 COUGH: ICD-10-CM

## 2020-02-04 PROCEDURE — 99214 OFFICE O/P EST MOD 30 MIN: CPT | Performed by: FAMILY MEDICINE

## 2020-02-04 RX ORDER — PROMETHAZINE HYDROCHLORIDE AND CODEINE PHOSPHATE 6.25; 1 MG/5ML; MG/5ML
5 SYRUP ORAL EVERY 8 HOURS PRN
Qty: 100 ML | Refills: 0 | Status: SHIPPED | OUTPATIENT
Start: 2020-02-04 | End: 2020-02-11

## 2020-02-04 RX ORDER — AZITHROMYCIN 250 MG/1
TABLET, FILM COATED ORAL
Qty: 6 TAB | Refills: 0 | Status: SHIPPED | OUTPATIENT
Start: 2020-02-04 | End: 2021-02-10

## 2020-02-04 RX ORDER — PREDNISONE 20 MG/1
TABLET ORAL
Qty: 5 TAB | Refills: 0 | Status: SHIPPED | OUTPATIENT
Start: 2020-02-04 | End: 2021-02-10

## 2020-02-05 NOTE — PROGRESS NOTES
CC: Persistent cough with phlegm    HPI:   Chloé presents today because she still having the cough, now it is productive with thick phlegm.  Patient stated that cough used to be only at night now that has been all day long and can happen anytime.  Denies any fever, chills, chest pain, or shortness of breath.,  Has been persistent and many times awakes her from sleeping.  Has been having normal oxygen saturation.  No wheeze.  Tried over-the-counter cough medicine it did not help.  She has been taking over-the-counter decongestant it helped a little bit, however symptoms never resolved.      Patient Active Problem List    Diagnosis Date Noted   • Stress at work 04/06/2015   • Atrophic vaginitis 04/06/2015   • Post menopausal syndrome 04/06/2015   • Memory difficulty 09/17/2014   • Bipolar 2 disorder (HCC) 01/27/2014   • Insomnia 01/27/2014   • Herpes genitalis in women 01/27/2014   • Migraine headache 01/27/2014       Current Outpatient Medications   Medication Sig Dispense Refill   • promethazine-codeine (PHENERGAN-CODEINE) 6.25-10 MG/5ML Syrup Take 5 mL by mouth every 8 hours as needed for Cough for up to 7 days. 100 mL 0   • azithromycin (ZITHROMAX) 250 MG Tab 500 mg for 1 day, then to 50 mg daily for 4 days 6 Tab 0   • predniSONE (DELTASONE) 20 MG Tab Take 1 tablet daily for 5 days. 5 Tab 0   • buPROPion SR (WELLBUTRIN SR) 150 MG TABLET SR 12 HR sustained-release tablet Take 150 mg by mouth 2 times a day.     • ibuprofen (MOTRIN) 200 MG Tab Take 800 mg by mouth every 6 hours as needed for Mild Pain.     • aspirin EC 81 MG EC tablet Take 1 Tab by mouth every day. (Patient not taking: Reported on 11/8/2019) 30 Tab 0     No current facility-administered medications for this visit.          Allergies as of 02/04/2020 - Reviewed 02/04/2020   Allergen Reaction Noted   • Nkda [no known drug allergy]  02/26/2014        ROS: Denies any chest pain, Shortness of breath, Changes bowel or bladder, Lower extremity  "edema.    Physical Exam:  /78 (BP Location: Left arm)   Pulse 79   Temp 37.1 °C (98.8 °F)   Resp 16   Ht 1.651 m (5' 5\")   Wt 65.8 kg (145 lb)   SpO2 98%   BMI 24.13 kg/m²   Gen.: Well-developed, well-nourished, no apparent distress,pleasant and cooperative with the examination  Skin:  Warm and dry with good turgor. No rashes or suspicious lesions in visible areas  HEENT:Sinuses nontender with palpation, TMs clear, nares patent with pink mucosa and clear rhinorrhea,no septal deviation ,polyps or lesions. lips without lesions, oropharynx clear.  Neck: Trachea midline,no masses or adenopathy. No JVD.  Cor: Regular rate and rhythm without murmur, gallop or rub.  Lungs: Respirations unlabored, decreased breath sounds bilaterally. No wheezes, rhonchi.  Extremities: No cyanosis, clubbing or edema.        Assessment and Plan.   64 y.o. female     1. Cough  Probable bronchitis with possible secondary infection.  Supportive treatment did not help.  Will start patient on Z-Bruno and oral steroids for 5 days.  Will give stronger cough medicine.  Continue on supportive treatment:  Rest  Hydration  Warm fluids  Cough drops/stronger cough medicine as prescribed  Avoid irritants.  Return to the clinic if no improvement.    - promethazine-codeine (PHENERGAN-CODEINE) 6.25-10 MG/5ML Syrup; Take 5 mL by mouth every 8 hours as needed for Cough for up to 7 days.  Dispense: 100 mL; Refill: 0  - azithromycin (ZITHROMAX) 250 MG Tab; 500 mg for 1 day, then to 50 mg daily for 4 days  Dispense: 6 Tab; Refill: 0  - predniSONE (DELTASONE) 20 MG Tab; Take 1 tablet daily for 5 days.  Dispense: 5 Tab; Refill: 0      "

## 2020-03-10 RX ORDER — BUPROPION HYDROCHLORIDE 150 MG/1
150 TABLET, EXTENDED RELEASE ORAL 2 TIMES DAILY
Qty: 180 TAB | Refills: 1 | Status: SHIPPED | OUTPATIENT
Start: 2020-03-10 | End: 2021-02-09

## 2020-05-06 ENCOUNTER — TELEPHONE (OUTPATIENT)
Dept: MEDICAL GROUP | Facility: MEDICAL CENTER | Age: 65
End: 2020-05-06

## 2020-05-06 DIAGNOSIS — R39.9 UTI SYMPTOMS: ICD-10-CM

## 2020-05-06 RX ORDER — NITROFURANTOIN 25; 75 MG/1; MG/1
100 CAPSULE ORAL 2 TIMES DAILY
Qty: 14 CAP | Refills: 0 | Status: SHIPPED | OUTPATIENT
Start: 2020-05-06 | End: 2021-02-10

## 2020-05-06 NOTE — TELEPHONE ENCOUNTER
Patient left a voicemail stating that she has had a UTI for about a week with burning. 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 Please advise

## 2020-05-06 NOTE — TELEPHONE ENCOUNTER
Need a proper UA and a culture if no improvement with macrobid ( macrobid 100 mg bid for 7 days sent).

## 2020-05-11 ENCOUNTER — TELEPHONE (OUTPATIENT)
Dept: MEDICAL GROUP | Facility: MEDICAL CENTER | Age: 65
End: 2020-05-11

## 2020-05-11 DIAGNOSIS — R39.9 UTI SYMPTOMS: ICD-10-CM

## 2020-05-11 RX ORDER — NITROFURANTOIN 25; 75 MG/1; MG/1
100 CAPSULE ORAL 2 TIMES DAILY
Qty: 14 CAP | Refills: 0 | Status: CANCELLED | OUTPATIENT
Start: 2020-05-11

## 2020-05-11 NOTE — TELEPHONE ENCOUNTER
Patient was given an antibiotic empirically and no improvement, so probably she needs  UA.  Order placed

## 2020-05-11 NOTE — TELEPHONE ENCOUNTER
Patient has almost finished her antibiotics and is still having symptoms. She is requesting another round of antibiotics to see if it clears it up.

## 2020-08-17 ENCOUNTER — TELEPHONE (OUTPATIENT)
Dept: MEDICAL GROUP | Facility: MEDICAL CENTER | Age: 65
End: 2020-08-17

## 2020-08-25 ENCOUNTER — OFFICE VISIT (OUTPATIENT)
Dept: MEDICAL GROUP | Facility: MEDICAL CENTER | Age: 65
End: 2020-08-25
Payer: MEDICARE

## 2020-08-25 VITALS
TEMPERATURE: 96.5 F | WEIGHT: 146 LBS | RESPIRATION RATE: 16 BRPM | SYSTOLIC BLOOD PRESSURE: 128 MMHG | DIASTOLIC BLOOD PRESSURE: 64 MMHG | HEART RATE: 74 BPM | BODY MASS INDEX: 24.32 KG/M2 | HEIGHT: 65 IN | OXYGEN SATURATION: 98 %

## 2020-08-25 DIAGNOSIS — B02.9 HERPES ZOSTER WITHOUT COMPLICATION: ICD-10-CM

## 2020-08-25 PROCEDURE — 99214 OFFICE O/P EST MOD 30 MIN: CPT | Performed by: FAMILY MEDICINE

## 2020-08-25 RX ORDER — VALACYCLOVIR HYDROCHLORIDE 1 G/1
1000 TABLET, FILM COATED ORAL 2 TIMES DAILY
Qty: 20 TAB | Refills: 0 | Status: SHIPPED | OUTPATIENT
Start: 2020-08-25 | End: 2020-08-26 | Stop reason: SDUPTHER

## 2020-08-25 NOTE — PROGRESS NOTES
CC: Skin sensitivity and burning sensation    HPI:   Chloé presents today with a very sensitive skin, and burning sensation over the area on the lateral aspect of the lower left chest and mid back.  The condition started 2 days ago, patient has a problem to wear a bra because of the skin sensitivity, patient stated that she has a problem he went to put on her shirt or sit against a chair.  Denies any fever, denies any skin rash on the area.      Patient Active Problem List    Diagnosis Date Noted   • Stress at work 04/06/2015   • Atrophic vaginitis 04/06/2015   • Post menopausal syndrome 04/06/2015   • Memory difficulty 09/17/2014   • Bipolar 2 disorder (HCC) 01/27/2014   • Insomnia 01/27/2014   • Herpes genitalis in women 01/27/2014   • Migraine headache 01/27/2014       Current Outpatient Medications   Medication Sig Dispense Refill   • valacyclovir (VALTREX) 1 GM Tab Take 1 Tab by mouth 2 times a day. 20 Tab 0   • nitrofurantoin (MACROBID) 100 MG Cap Take 1 Cap by mouth 2 times a day. (Patient not taking: Reported on 8/25/2020) 14 Cap 0   • buPROPion SR (WELLBUTRIN SR) 150 MG TABLET SR 12 HR sustained-release tablet Take 1 Tab by mouth 2 times a day. 180 Tab 1   • azithromycin (ZITHROMAX) 250 MG Tab 500 mg for 1 day, then to 50 mg daily for 4 days (Patient not taking: Reported on 8/25/2020) 6 Tab 0   • predniSONE (DELTASONE) 20 MG Tab Take 1 tablet daily for 5 days. (Patient not taking: Reported on 8/25/2020) 5 Tab 0   • aspirin EC 81 MG EC tablet Take 1 Tab by mouth every day. (Patient not taking: Reported on 11/8/2019) 30 Tab 0   • ibuprofen (MOTRIN) 200 MG Tab Take 800 mg by mouth every 6 hours as needed for Mild Pain.       No current facility-administered medications for this visit.          Allergies as of 08/25/2020 - Reviewed 08/25/2020   Allergen Reaction Noted   • Nkda [no known drug allergy]  02/26/2014        ROS: Denies any chest pain, Shortness of breath, Changes bowel or bladder, Lower extremity  "edema.    Physical Exam:  /64 (BP Location: Right arm, Patient Position: Sitting, BP Cuff Size: Adult)   Pulse 74   Temp 35.8 °C (96.5 °F) (Temporal)   Resp 16   Ht 1.651 m (5' 5\")   Wt 66.2 kg (146 lb)   SpO2 98%   BMI 24.30 kg/m²   Gen.: Well-developed, well-nourished, no apparent distress,pleasant and cooperative with the examination  Skin: Skin over the lateral lower left chest and mid back is sensitive and tender to touch, no obvious rash, little bit red.          Assessment and Plan.   65 y.o. female     1. Herpes zoster without complication  I will give the patient the benefit of doubt and start antiviral medication.  Patient advised to use Valtrex for a week if no rash shows up, and for 10 days if the rash shows up.  Advised to return to the clinic if she develops a rash, so we will start early gabapentin to avoid/or decreases the severity of the postherpetic neuralgia.    - valacyclovir (VALTREX) 1 GM Tab; Take 1 Tab by mouth 2 times a day.  Dispense: 20 Tab; Refill: 0      "

## 2020-08-26 DIAGNOSIS — B02.9 HERPES ZOSTER WITHOUT COMPLICATION: ICD-10-CM

## 2020-08-26 RX ORDER — VALACYCLOVIR HYDROCHLORIDE 1 G/1
1000 TABLET, FILM COATED ORAL 2 TIMES DAILY
Qty: 20 TAB | Refills: 0 | Status: SHIPPED | OUTPATIENT
Start: 2020-08-26 | End: 2021-02-10

## 2020-08-26 NOTE — TELEPHONE ENCOUNTER
Received request via: Patient    Was the patient seen in the last year in this department? yes    Does the patient have an active prescription (recently filled or refills available) for medication(s) requested? No

## 2020-09-15 ENCOUNTER — OFFICE VISIT (OUTPATIENT)
Dept: MEDICAL GROUP | Facility: MEDICAL CENTER | Age: 65
End: 2020-09-15
Payer: MEDICARE

## 2020-09-15 VITALS
WEIGHT: 145.5 LBS | DIASTOLIC BLOOD PRESSURE: 62 MMHG | HEIGHT: 65 IN | OXYGEN SATURATION: 97 % | BODY MASS INDEX: 24.24 KG/M2 | HEART RATE: 66 BPM | RESPIRATION RATE: 16 BRPM | TEMPERATURE: 98.7 F | SYSTOLIC BLOOD PRESSURE: 120 MMHG

## 2020-09-15 DIAGNOSIS — Z12.31 SCREENING MAMMOGRAM, ENCOUNTER FOR: ICD-10-CM

## 2020-09-15 DIAGNOSIS — M79.2 NEUROPATHIC PAIN: ICD-10-CM

## 2020-09-15 PROCEDURE — 99214 OFFICE O/P EST MOD 30 MIN: CPT | Performed by: FAMILY MEDICINE

## 2020-09-15 RX ORDER — GABAPENTIN 100 MG/1
100 CAPSULE ORAL 2 TIMES DAILY
Qty: 60 CAP | Refills: 0 | Status: SHIPPED | OUTPATIENT
Start: 2020-09-15 | End: 2021-02-10

## 2020-09-15 NOTE — PROGRESS NOTES
CC: Skin pain and increased sensitivity    HPI:   Chloé presents today discuss the following:    Patient still reporting pain and burning sensation starting mid back on the left side all the way to the lateral anterior chest, she is stating that the skin has been very sensitive, but she did not developed any rash.  Discussed with patient that shingles without rash is possible but uncommon.  Will try gabapentin to help with that pain.  Denies any fever, chills, cough, or shortness of breath.    Patient is due for mammogram    Patient Active Problem List    Diagnosis Date Noted   • Stress at work 04/06/2015   • Atrophic vaginitis 04/06/2015   • Post menopausal syndrome 04/06/2015   • Memory difficulty 09/17/2014   • Bipolar 2 disorder (HCC) 01/27/2014   • Insomnia 01/27/2014   • Herpes genitalis in women 01/27/2014   • Migraine headache 01/27/2014       Current Outpatient Medications   Medication Sig Dispense Refill   • gabapentin (NEURONTIN) 100 MG Cap Take 1 Cap by mouth 2 Times a Day. 60 Cap 0   • valacyclovir (VALTREX) 1 GM Tab Take 1 Tab by mouth 2 times a day. 20 Tab 0   • nitrofurantoin (MACROBID) 100 MG Cap Take 1 Cap by mouth 2 times a day. (Patient not taking: Reported on 8/25/2020) 14 Cap 0   • buPROPion SR (WELLBUTRIN SR) 150 MG TABLET SR 12 HR sustained-release tablet Take 1 Tab by mouth 2 times a day. 180 Tab 1   • azithromycin (ZITHROMAX) 250 MG Tab 500 mg for 1 day, then to 50 mg daily for 4 days (Patient not taking: Reported on 8/25/2020) 6 Tab 0   • predniSONE (DELTASONE) 20 MG Tab Take 1 tablet daily for 5 days. (Patient not taking: Reported on 8/25/2020) 5 Tab 0   • aspirin EC 81 MG EC tablet Take 1 Tab by mouth every day. (Patient not taking: Reported on 11/8/2019) 30 Tab 0   • ibuprofen (MOTRIN) 200 MG Tab Take 800 mg by mouth every 6 hours as needed for Mild Pain.       No current facility-administered medications for this visit.          Allergies as of 09/15/2020 - Reviewed 09/15/2020  "  Allergen Reaction Noted   • Nkda [no known drug allergy]  02/26/2014        ROS: Denies any chest pain, Shortness of breath, Changes bowel or bladder, Lower extremity edema.    Physical Exam:  /62 (BP Location: Right arm, Patient Position: Sitting, BP Cuff Size: Adult)   Pulse 66   Temp 37.1 °C (98.7 °F) (Temporal)   Resp 16   Ht 1.651 m (5' 5\")   Wt 66 kg (145 lb 8.1 oz)   SpO2 97%   BMI 24.21 kg/m²   Gen.: Well-developed, well-nourished, no apparent distress,pleasant and cooperative with the examination  Skin: Skin on the lateral aspect of the left side of the chest is sensitive and slightly tender.  No rash      Assessment and Plan.   65 y.o. female     1. Neuropathic pain  Shingles without rash is possible but uncommon.  However will try gabapentin  Patient advised to return to the clinic if she develops any rash    - gabapentin (NEURONTIN) 100 MG Cap; Take 1 Cap by mouth 2 Times a Day.  Dispense: 60 Cap; Refill: 0    2. Screening mammogram, encounter for  Due for mammogram.    - MA-SCREENING MAMMO BILAT W/CAD; Future      "

## 2020-11-06 ENCOUNTER — NURSE TRIAGE (OUTPATIENT)
Dept: HEALTH INFORMATION MANAGEMENT | Facility: OTHER | Age: 65
End: 2020-11-06

## 2020-11-07 ENCOUNTER — OFFICE VISIT (OUTPATIENT)
Dept: URGENT CARE | Facility: CLINIC | Age: 65
End: 2020-11-07
Payer: MEDICARE

## 2020-11-07 VITALS
DIASTOLIC BLOOD PRESSURE: 78 MMHG | TEMPERATURE: 97.4 F | WEIGHT: 138 LBS | HEART RATE: 82 BPM | BODY MASS INDEX: 22.99 KG/M2 | SYSTOLIC BLOOD PRESSURE: 130 MMHG | HEIGHT: 65 IN | OXYGEN SATURATION: 99 %

## 2020-11-07 DIAGNOSIS — J06.9 VIRAL URI WITH COUGH: ICD-10-CM

## 2020-11-07 DIAGNOSIS — Z20.822 EXPOSURE TO COVID-19 VIRUS: ICD-10-CM

## 2020-11-07 DIAGNOSIS — J02.9 PHARYNGITIS, UNSPECIFIED ETIOLOGY: ICD-10-CM

## 2020-11-07 LAB
INT CON NEG: NEGATIVE
INT CON POS: POSITIVE
S PYO AG THROAT QL: NEGATIVE

## 2020-11-07 PROCEDURE — 99213 OFFICE O/P EST LOW 20 MIN: CPT | Mod: CS | Performed by: PHYSICIAN ASSISTANT

## 2020-11-07 PROCEDURE — 87880 STREP A ASSAY W/OPTIC: CPT | Performed by: PHYSICIAN ASSISTANT

## 2020-11-07 ASSESSMENT — ENCOUNTER SYMPTOMS
NAUSEA: 0
BLURRED VISION: 0
MYALGIAS: 1
SORE THROAT: 1
PALPITATIONS: 0
DIARRHEA: 0
CHILLS: 0
DIZZINESS: 0
ABDOMINAL PAIN: 0
VOMITING: 0
COUGH: 1
FATIGUE: 1
SHORTNESS OF BREATH: 0
SINUS PAIN: 0
FEVER: 0
HEADACHES: 1
EYE PAIN: 0

## 2020-11-07 NOTE — TELEPHONE ENCOUNTER
Stuffy nose, coughing , HA, body aches, sore throat, chills, fatigue, sweats.  Started 11/6.  No underlying health conditions.

## 2020-11-07 NOTE — LETTER
November 7, 2020       Patient: Chloé Cummings   YOB: 1955   Date of Visit: 11/7/2020       To Whom it May Concern:    Chloé Cummings was seen in my clinic on 11/7/2020. A concern for COVID-19 has been identified and testing is in progress. They will be able to access their results through our electronic delivery system called [employer].     We are asking you to excuse absences while they follow self-isolation protocol per CDC guidelines.   • 10 days since symptoms first appeared and   • 24 hours with no fever without the use of fever-reducing medications and   • Other symptoms of COVID-19 are improving*  *Loss of taste and smell may persist for weeks or months after recovery and need not delay the end of isolation    Most people do not require testing to decide when they can be around others. If results are negative your employee must continue to follow the self-isolation protocol.    If the results of testing are positive then they will be contacted by the [facility] [employer] of [facility] for further instructions on duration of self-isolation and return to work protocol. In general this will also follow the CDC guidelines with a minimum of 10 days from the onset with improving symptoms and no fever.    This is the only note that will be provided from [facility] for this visit. Please schedule a visit with primary care if documents for FMLA, disability, or unemployment are required.       If you have any questions or concerns, please don't hesitate to call.        Sincerely,           Nicole Quintero P.A.-C.  Electronically Signed

## 2020-11-08 NOTE — PROGRESS NOTES
Subjective:      Chloé Cummings is a 65 y.o. female who presents with Cough, Body Aches (started yesterday ), and Fatigue (positive co worker )      HPI:  This is a new problem. Chloé Cummings is a 65 y.o. female who presents today for evaluation of URI symptoms.  Patient states that starting yesterday she woke up cough, body aches, fatigue, sore throat, and mild increased nasal congestion.  She states that she feels much better today but she is still having mild sore throat, body aches, cough, and fatigue.  She states that her office closed on earlier this week because of a Covid exposure but it was simply that she was not directly in contact with it.  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  She is not sure if she has had any direct contacts or not but with these possible exposures and the fact that she developed symptoms yesterday she needed to come in for Covid testing.  She is not having any shortness of breath, chest pain, fever/chills, lightheadedness/dizziness, or nausea/vomiting/diarrhea.  She has taken some ibuprofen for symptoms which has provided moderate relief.      Review of Systems   Constitutional: Positive for fatigue and malaise/fatigue. Negative for chills and fever.   HENT: Positive for congestion and sore throat. Negative for ear pain and sinus pain.    Eyes: Negative for blurred vision and pain.   Respiratory: Positive for cough. Negative for shortness of breath.    Cardiovascular: Negative for chest pain and palpitations.   Gastrointestinal: Negative for abdominal pain, diarrhea, nausea and vomiting.   Musculoskeletal: Positive for myalgias.   Skin: Negative for rash.   Neurological: Positive for headaches. Negative for dizziness.       PMH:  has a past medical history of Bipolar 2 disorder (HCC), Herpes genitalis in women, Insomnia, and Migraines.  MEDS:   Current Outpatient Medications:   •  buPROPion SR (WELLBUTRIN SR) 150 MG  "TABLET SR 12 HR sustained-release tablet, Take 1 Tab by mouth 2 times a day., Disp: 180 Tab, Rfl: 1  •  gabapentin (NEURONTIN) 100 MG Cap, Take 1 Cap by mouth 2 Times a Day. (Patient not taking: Reported on 11/7/2020), Disp: 60 Cap, Rfl: 0  •  valacyclovir (VALTREX) 1 GM Tab, Take 1 Tab by mouth 2 times a day., Disp: 20 Tab, Rfl: 0  •  nitrofurantoin (MACROBID) 100 MG Cap, Take 1 Cap by mouth 2 times a day. (Patient not taking: Reported on 8/25/2020), Disp: 14 Cap, Rfl: 0  •  azithromycin (ZITHROMAX) 250 MG Tab, 500 mg for 1 day, then to 50 mg daily for 4 days (Patient not taking: Reported on 8/25/2020), Disp: 6 Tab, Rfl: 0  •  predniSONE (DELTASONE) 20 MG Tab, Take 1 tablet daily for 5 days. (Patient not taking: Reported on 8/25/2020), Disp: 5 Tab, Rfl: 0  •  aspirin EC 81 MG EC tablet, Take 1 Tab by mouth every day. (Patient not taking: Reported on 11/8/2019), Disp: 30 Tab, Rfl: 0  •  ibuprofen (MOTRIN) 200 MG Tab, Take 800 mg by mouth every 6 hours as needed for Mild Pain., Disp: , Rfl:   ALLERGIES:   Allergies   Allergen Reactions   • Nkda [No Known Drug Allergy]      SURGHX:   Past Surgical History:   Procedure Laterality Date   • CRANIOTOMY  2011    Meningioma   • ARTHROSCOPY, KNEE     • SHOULDER ARTHROSCOPY       SOCHX:  reports that she has never smoked. She has never used smokeless tobacco. She reports that she does not drink alcohol or use drugs.  FH: Family history was reviewed, no pertinent findings to report     Objective:     /78   Pulse 82   Ht 1.651 m (5' 5\")   Wt 62.6 kg (138 lb)   SpO2 99%   BMI 22.96 kg/m²      Physical Exam  Constitutional:       Appearance: She is well-developed.   HENT:      Head: Normocephalic and atraumatic.      Right Ear: Tympanic membrane, ear canal and external ear normal.      Left Ear: Tympanic membrane, ear canal and external ear normal.      Nose: Mucosal edema and congestion present. No rhinorrhea.      Mouth/Throat:      Lips: Pink.      Mouth: Mucous " membranes are moist.      Pharynx: Oropharynx is clear. Uvula midline. Posterior oropharyngeal erythema present. No oropharyngeal exudate or uvula swelling.   Eyes:      Conjunctiva/sclera: Conjunctivae normal.      Pupils: Pupils are equal, round, and reactive to light.   Neck:      Musculoskeletal: Normal range of motion.   Cardiovascular:      Rate and Rhythm: Normal rate and regular rhythm.      Heart sounds: Normal heart sounds. No murmur.   Pulmonary:      Effort: Pulmonary effort is normal.      Breath sounds: Normal breath sounds. No wheezing.   Lymphadenopathy:      Cervical: No cervical adenopathy.   Skin:     General: Skin is warm and dry.      Capillary Refill: Capillary refill takes less than 2 seconds.   Neurological:      Mental Status: She is alert and oriented to person, place, and time.   Psychiatric:         Behavior: Behavior normal.         Judgment: Judgment normal.         POCT Rapid Strep A - Negative     Assessment/Plan:     1. Exposure to COVID-19 virus  - COVID/SARS COV-2 PCR; Future    2. Viral URI with cough  - COVID/SARS COV-2 PCR; Future  - OTC cold/flu medications  - PO fluids  - Rest  - Tylenol or ibuprofen as needed for fever > 100.4 F  *Patient had a nasal swab to test for COVID-19 virus.  Patient was advised to stay home and self isolate/self quarantine while awaiting the results.  Supportive care was reiterated.  Return/ER precautions discussed.     3. Pharyngitis, unspecified etiology  - POCT Rapid Strep A  -Supportive care discussed to include salt water gargles, throat lozenges, and increased fluid intake            Differential Diagnosis, natural history, and supportive care discussed. Return to the Urgent Care or follow up with your PCP if symptoms fail to resolve, or for any new or worsening symptoms. Emergency room precautions discussed. Patient and/or family appears understanding of information.

## 2020-11-11 ENCOUNTER — HOSPITAL ENCOUNTER (OUTPATIENT)
Facility: MEDICAL CENTER | Age: 65
End: 2020-11-11
Attending: PHYSICIAN ASSISTANT
Payer: MEDICARE

## 2020-11-11 ENCOUNTER — TELEPHONE (OUTPATIENT)
Dept: URGENT CARE | Facility: CLINIC | Age: 65
End: 2020-11-11

## 2020-11-11 DIAGNOSIS — Z20.822 EXPOSURE TO COVID-19 VIRUS: ICD-10-CM

## 2020-11-11 PROCEDURE — U0003 INFECTIOUS AGENT DETECTION BY NUCLEIC ACID (DNA OR RNA); SEVERE ACUTE RESPIRATORY SYNDROME CORONAVIRUS 2 (SARS-COV-2) (CORONAVIRUS DISEASE [COVID-19]), AMPLIFIED PROBE TECHNIQUE, MAKING USE OF HIGH THROUGHPUT TECHNOLOGIES AS DESCRIBED BY CMS-2020-01-R: HCPCS

## 2020-11-11 NOTE — TELEPHONE ENCOUNTER
Lab states that they do not see this in the lab. They would like us to put in a new order.     I contacted the pt. And she will come to our office in 10 minutes.   Please add a new order.

## 2020-11-12 LAB
COVID ORDER STATUS COVID19: NORMAL
SARS-COV-2 RNA RESP QL NAA+PROBE: NOTDETECTED
SPECIMEN SOURCE: NORMAL

## 2020-12-07 ENCOUNTER — NURSE TRIAGE (OUTPATIENT)
Dept: HEALTH INFORMATION MANAGEMENT | Facility: OTHER | Age: 65
End: 2020-12-07

## 2020-12-08 NOTE — TELEPHONE ENCOUNTER
Work for [employer].  [facility] care to patient on Saturday 12/5 was exposed to patient who is tested positive for Covid.    Pt has Sore throat and cough symptoms began.    Reason for Disposition  • [1] COVID-19 EXPOSURE within last 14 days AND [2] NO cough, fever, or breathing difficulty AND [3] exposed person is a healthcare worker who was NOT using all recommended personal protective equipment (i.e., a respirator-N95 mask, eye protection, gloves, and gown)    Additional Information  • Negative: SEVERE difficulty breathing (e.g., struggling for each breath, speak in single words, bluish lips)  • Negative: Sounds like a life-threatening emergency to the triager  • Negative: [1] Adult has symptoms of COVID-19 (fever, cough, or SOB) AND [2] lab test positive  • Negative: [1] Adult has symptoms of COVID-19 (fever, cough or SOB) AND [2] major community spread where patient lives AND [3] testing not being done for mild symptoms  • Negative: [1] Difficulty breathing (shortness of breath) occurs AND [2] onset > 14 days after COVID-19 EXPOSURE (Close Contact) AND [3] no major community spread  • Negative: [1] Cough occurs AND [2] onset > 14 days after COVID-19 EXPOSURE AND [3] no major community spread  • Negative: [1] Common cold symptoms AND [2] onset > 14 days after COVID-19 EXPOSURE AND [3] no major community spread  • Negative: [1] Difficulty breathing occurs AND [2] within 14 days of COVID-19 EXPOSURE (Close Contact)  • Negative: Patient sounds very sick or weak to the triager  • Negative: [1] Fever (or feeling feverish) OR cough AND [2] within 14 Days of COVID-19 EXPOSURE (Close Contact)  • Negative: [1] Fever (or feeling feverish) OR cough occurs AND [2] within 14 days of travel from another country (international travel)  • Negative: [1] Fever (or feeling feverish) OR cough occurs AND [2] within 14 days of travel from a city or area with major community spread  • Negative: [1] Fever (or feeling feverish) OR  "cough occurs AND [2] living in area with major community spread AND [3] testing being done in the community for symptoms  • Negative: [1] Mild body aches, chills, diarrhea, headache, runny nose, or sore throat AND [2] within 14 days of COVID-19 EXPOSURE    Answer Assessment - Initial Assessment Questions  1. CLOSE CONTACT: \"Who is the person with the confirmed or suspected COVID-19 infection that you were exposed to?\"      12/5  2. PLACE of CONTACT: \"Where were you when you were exposed to COVID-19?\" (e.g., home, school, medical waiting room; which city?)      In patient home  3. TYPE of CONTACT: \"How much contact was there?\" (e.g., sitting next to, live in same house, work in same office, same building)      2 hours of direct care  4. DURATION of CONTACT: \"How long were you in contact with the COVID-19 patient?\" (e.g., a few seconds, passed by person, a few minutes, live with the patient)     2 hours  5. DATE of CONTACT: \"When did you have contact with a COVID-19 patient?\" (e.g., how many days ago)      12/05  6. TRAVEL: \"Have you traveled out of the country recently?\" If so, \"When and where?\"      * Also ask about out-of-state travel, since the CDC has identified some high risk cities for community spread in the .      * Note: Travel becomes less relevant if there is widespread community transmission where the patient lives.      no  7. COMMUNITY SPREAD: \"Are there lots of cases of COVID-19 (community spread) where you live?\" (See public health department website, if unsure)    * MAJOR community spread: high number of cases; numbers of cases are increasing; many people hospitalized.    * MINOR community spread: low number of cases; not increasing; few or no people hospitalized      major  8. SYMPTOMS: \"Do you have any symptoms?\" (e.g., fever, cough, breathing difficulty)      Sore throat and cough  9. PREGNANCY OR POSTPARTUM: \"Is there any chance you are pregnant?\" \"When was your last menstrual period?\" \"Did you " "deliver in the last 2 weeks?\"      no  10. HIGH RISK: \"Do you have any heart or lung problems? Do you have a weak immune system?\" (e.g., CHF, COPD, asthma, HIV positive, chemotherapy, renal failure, diabetes mellitus, sickle cell anemia)        none    Protocols used: CORONAVIRUS (COVID-19) EXPOSURE-A-OH      "

## 2021-02-09 ENCOUNTER — NURSE TRIAGE (OUTPATIENT)
Dept: HEALTH INFORMATION MANAGEMENT | Facility: OTHER | Age: 66
End: 2021-02-09

## 2021-02-09 NOTE — TELEPHONE ENCOUNTER
"Wanted to make virtual w/PCP.    HA yesterday, in occipital area to shoulder blades, having frequent HAs, has migraines, but has had more than usual.     Reason for Disposition  • Patient wants to be seen    Additional Information  • Negative: Difficult to awaken or acting confused (e.g., disoriented, slurred speech)  • Negative: Weakness of the face, arm or leg on one side of the body and new onset  • Negative: Numbness of the face, arm or leg on one side of the body and new onset  • Negative: Loss of speech or garbled speech and new onset  • Negative: Passed out (i.e., fainted, collapsed and was not responding)  • Negative: Sounds like a life-threatening emergency to the triager  • Negative: Followed a head injury within last 3 days  • Negative: Traumatic Brain Injury (TBI) is suspected  • Negative: Sinus pain of forehead and yellow or green nasal discharge  • Negative: Pregnant  • Negative: Unable to walk without falling  • Negative: Stiff neck (can't touch chin to chest)  • Negative: Possibility of carbon monoxide exposure  • Negative: SEVERE headache, states 'worst headache' of life  • Negative: SEVERE headache, sudden onset (i.e., reaching maximum intensity within 30 seconds)  • Negative: Severe pain in one eye  • Negative: Loss of vision or double vision  • Negative: Patient sounds very sick or weak to the triager    Answer Assessment - Initial Assessment Questions  1. LOCATION: \"Where does it hurt?\"       In occipital & frontal area of head, light sensitive today  2. ONSET: \"When did the headache start?\" (Minutes, hours or days)       040 yesterday, 2/8  3. PATTERN: \"Does the pain come and go, or has it been constant since it started?\"      constant  4. SEVERITY: \"How bad is the pain?\" and \"What does it keep you from doing?\"  (e.g., Scale 1-10; mild, moderate, or severe)    - MILD (1-3): doesn't interfere with normal activities     - MODERATE (4-7): interferes with normal activities or awakens from sleep     " "- SEVERE (8-10): excruciating pain, unable to do any normal activities         3 to 4 right now  5. RECURRENT SYMPTOM: \"Have you ever had headaches before?\" If so, ask: \"When was the last time?\" and \"What happened that time?\"       Yes, have migraines but been having them more often, one having now does not feel like a migraine, does not have pounding or cause nausea (usually experience these symptoms w/a migraine)  6. CAUSE: \"What do you think is causing the headache?\"      Extremely stressed by work, having tachycardia, had panic attack yesterday, need to reach out to someone, pulse rate 100, has svt which comes & goes, don't  Tachycardia she thinks is caused by stress.  Got 2nd covid shot on 1/25.  Have not felt well since then.  This episode of tachycardia came on yesterday.    7. MIGRAINE: \"Have you been diagnosed with migraine headaches?\" If so, ask: \"Is this headache similar?\"       Yes, this HA is different, see above  8. HEAD INJURY: \"Has there been any recent injury to the head?\"       no  9. OTHER SYMPTOMS: \"Do you have any other symptoms?\" (fever, stiff neck, eye pain, sore throat, cold symptoms)      Neck is stiff, muscles sore in back of neck.    10. PREGNANCY: \"Is there any chance you are pregnant?\" \"When was your last menstrual period?\"        NA    Protocols used: HEADACHE-A-OH      "

## 2021-02-09 NOTE — TELEPHONE ENCOUNTER
Regarding: Patient is experiencing patient is experiencing headache/ migraine, neck tightness, muscle soreness,  ----- Message from Ligia Galan sent at 2/9/2021  2:45 PM PST -----  Patient inbound TC to [facility] Scheduling to make an appointment with PCP. Patient is experiencing patient is experiencing headache/ migraine, neck tightness, muscle soreness, and anxiety. I encouraged patient to speak with Nurse Advice Team per active symptoms protocol and patient agreed.

## 2021-02-10 ENCOUNTER — TELEMEDICINE (OUTPATIENT)
Dept: MEDICAL GROUP | Facility: MEDICAL CENTER | Age: 66
End: 2021-02-10
Payer: MEDICARE

## 2021-02-10 DIAGNOSIS — F32.A ANXIETY AND DEPRESSION: ICD-10-CM

## 2021-02-10 DIAGNOSIS — M54.2 NECK PAIN: ICD-10-CM

## 2021-02-10 DIAGNOSIS — F41.9 ANXIETY AND DEPRESSION: ICD-10-CM

## 2021-02-10 PROCEDURE — 99214 OFFICE O/P EST MOD 30 MIN: CPT | Mod: 95,CR | Performed by: FAMILY MEDICINE

## 2021-02-10 RX ORDER — SERTRALINE HYDROCHLORIDE 25 MG/1
25 TABLET, FILM COATED ORAL DAILY
Qty: 90 TABLET | Refills: 2 | Status: SHIPPED | OUTPATIENT
Start: 2021-02-10

## 2021-02-11 NOTE — PROGRESS NOTES
Virtual Visit: Established Patient   This visit was conducted via Zoom  using secure and encrypted videoconferencing technology. The patient was in a private location in the state of [place].    The patient's identity was confirmed and verbal consent was obtained for this virtual visit.      CC: Neck pain/headache, depression/anxiety    ,                                                                                                                              HPI:   Chloé presents today with the following.    Neck pain  Patient stating that she has been having pain on the back of her head and neck for few days.  Reported recent history of fall while she was running after her her dog, she fell on her knees that she bent forward Hardly against her neck, denies head trauma, she did not hit her head against the ground.    Anxiety and depression  Mood has been fluctuating, she has been feeling more anxious lately.  Denies any suicidal ideation.  She has been on Wellbutrin 50 mg twice a day.  She has an appointment with her counselor.      Patient Active Problem List    Diagnosis Date Noted   • Stress at work 04/06/2015   • Atrophic vaginitis 04/06/2015   • Post menopausal syndrome 04/06/2015   • Memory difficulty 09/17/2014   • Bipolar 2 disorder (HCC) 01/27/2014   • Insomnia 01/27/2014   • Herpes genitalis in women 01/27/2014   • Migraine headache 01/27/2014       Current Outpatient Medications   Medication Sig Dispense Refill   • sertraline (ZOLOFT) 25 MG tablet Take 1 tablet by mouth every day. 90 tablet 2   • buPROPion SR (WELLBUTRIN-SR) 150 MG TABLET SR 12 HR sustained-release tablet TAKE ONE TABLET BY MOUTH TWICE DAILY 180 Tab 3     No current facility-administered medications for this visit.         Allergies as of 02/10/2021 - Reviewed 02/10/2021   Allergen Reaction Noted   • Nkda [no known drug allergy]  02/26/2014        ROS:  Denies, chest pain, Shortness of breath, Edema.     There were no vitals taken for  this visit.      Physical Exam:  Constitutional: Alert, no distress, well-groomed.  Skin: No rashes in visible areas.  Eye: Round. Conjunctiva clear, lNo icterus.   ENMT: Lips pink without lesions, good dentition, moist mucous membranes. Phonation normal.  Neck: No masses, no thyromegaly. Moves freely without pain.  CV: Pulse as reported by patient  Respiratory: Unlabored respiratory effort, no cough or audible wheeze  Psych: Alert and oriented x3, normal affect and mood.          Assessment and Plan.   65 y.o. female with the following issues.    1. Neck pain  Send patient for cervical spine x-ray, then an MRI cervical spine if needed.    - DX-CERVICAL SPINE-2 OR 3 VIEWS; Future    2. Anxiety and depression  Mood change has been more frequent.    Patient advised to start Zoloft 25 mg daily, continue Wellbutrin 150 mg twice a day.    - sertraline (ZOLOFT) 25 MG tablet; Take 1 tablet by mouth every day.  Dispense: 90 tablet; Refill: 2

## 2021-02-19 ENCOUNTER — APPOINTMENT (OUTPATIENT)
Dept: RADIOLOGY | Facility: MEDICAL CENTER | Age: 66
End: 2021-02-19
Attending: FAMILY MEDICINE
Payer: MEDICARE

## 2021-02-22 ENCOUNTER — HOSPITAL ENCOUNTER (OUTPATIENT)
Dept: RADIOLOGY | Facility: MEDICAL CENTER | Age: 66
End: 2021-02-22
Attending: FAMILY MEDICINE
Payer: MEDICARE

## 2021-02-22 DIAGNOSIS — M54.2 NECK PAIN: ICD-10-CM

## 2021-02-22 PROCEDURE — 72040 X-RAY EXAM NECK SPINE 2-3 VW: CPT

## 2021-02-23 DIAGNOSIS — M50.30 DDD (DEGENERATIVE DISC DISEASE), CERVICAL: ICD-10-CM

## 2021-03-02 ENCOUNTER — TELEPHONE (OUTPATIENT)
Dept: MEDICAL GROUP | Facility: MEDICAL CENTER | Age: 66
End: 2021-03-02

## 2021-03-02 NOTE — TELEPHONE ENCOUNTER
Pt called wanted to know if she was sent to a Neurologist or Neuro surgeon, phone is busy  Tried multiple times

## 2021-03-03 DIAGNOSIS — Z23 NEED FOR VACCINATION: ICD-10-CM

## 2021-03-05 ENCOUNTER — TELEMEDICINE (OUTPATIENT)
Dept: MEDICAL GROUP | Facility: MEDICAL CENTER | Age: 66
End: 2021-03-05
Payer: MEDICARE

## 2021-03-05 VITALS — DIASTOLIC BLOOD PRESSURE: 76 MMHG | TEMPERATURE: 97 F | SYSTOLIC BLOOD PRESSURE: 117 MMHG

## 2021-03-05 DIAGNOSIS — F41.9 ANXIETY: ICD-10-CM

## 2021-03-05 DIAGNOSIS — R93.89 ABNORMAL X-RAY: ICD-10-CM

## 2021-03-05 PROCEDURE — 99214 OFFICE O/P EST MOD 30 MIN: CPT | Mod: 95,CR | Performed by: FAMILY MEDICINE

## 2021-03-05 RX ORDER — HYDROXYZINE HYDROCHLORIDE 25 MG/1
TABLET, FILM COATED ORAL
Qty: 30 TABLET | Refills: 2 | Status: SHIPPED | OUTPATIENT
Start: 2021-03-05

## 2021-03-05 NOTE — PROGRESS NOTES
Virtual Visit: Established Patient   This visit was conducted via Zoom  using secure and encrypted videoconferencing technology. The patient was in a private location in the state of [place].    The patient's identity was confirmed and verbal consent was obtained for this virtual visit.      CC: Neck x-ray                                                                                                                                     HPI:   Chloé presents today with the following.    Abnormal x-ray of cervical spine  patient x-ray showed:    The result discussed with the patient.  Patientadvised to be evaluated by a neurosurgeon.  Discussed that she might have multiple options: Physical therapy, versus epidurals versus surgery.  However the neurosurgeon will decide which way to go.    Anxiety  Patient has history of bipolar type II, currently on Wellbutrin and Zoloft.  Sometimes she feels anxious at night and she has been having a problem with sleep, she stated that Benadryl has been helping.  Advised to try hydroxyzine      Patient Active Problem List    Diagnosis Date Noted   • Stress at work 04/06/2015   • Atrophic vaginitis 04/06/2015   • Post menopausal syndrome 04/06/2015   • Memory difficulty 09/17/2014   • Bipolar 2 disorder (HCC) 01/27/2014   • Insomnia 01/27/2014   • Herpes genitalis in women 01/27/2014   • Migraine headache 01/27/2014       Current Outpatient Medications   Medication Sig Dispense Refill   • hydrOXYzine HCl (ATARAX) 25 MG Tab Take one every night as needed. 30 tablet 2   • sertraline (ZOLOFT) 25 MG tablet Take 1 tablet by mouth every day. 90 tablet 2   • buPROPion SR (WELLBUTRIN-SR) 150 MG TABLET SR 12 HR sustained-release tablet TAKE ONE TABLET BY MOUTH TWICE DAILY 180 Tab 3     No current facility-administered medications for this visit.         Allergies as of 03/05/2021 - Reviewed 03/05/2021   Allergen Reaction Noted   • Nkda [no known drug allergy]  02/26/2014        ROS:  Dileepies,  chest pain, Shortness of breath, Edema.     /76   Temp 36.1 °C (97 °F)       Physical Exam:  Constitutional: Alert, no distress, well-groomed.  Skin: No rashes in visible areas.  Eye: Round. Conjunctiva clear, lNo icterus.   ENMT: Lips pink without lesions, good dentition, moist mucous membranes. Phonation normal.  Neck: No masses, no thyromegaly. Moves freely without pain.  CV: Pulse as reported by patient  Respiratory: Unlabored respiratory effort, no cough or audible wheeze  Psych: Alert and oriented x3, normal affect and mood.          Assessment and Plan.   65 y.o. female with the following issues.    1. Abnormal x-ray  Note discussed with the patient.  Advised to follow-up with neurosurgery.  Referral placed    2. Anxiety/insomnia  History of bipolar type II, anxiety  Continue Wellbutrin and Zoloft  Hydroxyzine to be used as needed at night.  - hydrOXYzine HCl (ATARAX) 25 MG Tab; Take one every night as needed.  Dispense: 30 tablet; Refill: 2

## 2021-03-23 ENCOUNTER — HOSPITAL ENCOUNTER (OUTPATIENT)
Dept: RADIOLOGY | Facility: MEDICAL CENTER | Age: 66
End: 2021-03-23
Attending: FAMILY MEDICINE
Payer: MEDICARE

## 2021-03-23 DIAGNOSIS — Z12.31 SCREENING MAMMOGRAM, ENCOUNTER FOR: ICD-10-CM

## 2021-03-23 PROCEDURE — 77063 BREAST TOMOSYNTHESIS BI: CPT

## 2021-03-25 ENCOUNTER — TELEPHONE (OUTPATIENT)
Dept: MEDICAL GROUP | Facility: MEDICAL CENTER | Age: 66
End: 2021-03-25

## 2021-03-25 DIAGNOSIS — M50.30 DDD (DEGENERATIVE DISC DISEASE), CERVICAL: ICD-10-CM

## 2021-03-25 NOTE — TELEPHONE ENCOUNTER
Patient should call her insurance to make sure it is covered by her insurance then I will do the referral

## 2021-03-25 NOTE — TELEPHONE ENCOUNTER
Patient was referred to [facility] neurology and was unhappy with the service. She does not want to go back and wants a new referral to a new neurologist Surekha PIZARRO at [facility]

## 2021-04-08 ENCOUNTER — TELEPHONE (OUTPATIENT)
Dept: MEDICAL GROUP | Facility: MEDICAL CENTER | Age: 66
End: 2021-04-08

## 2021-04-08 NOTE — TELEPHONE ENCOUNTER
Phone Number Called: [phone]    Call outcome: Did not leave a detailed message. Requested patient to call back.    Message: Call received form patient that states she has called 3-25-21, 3-26-21, and 4-1-2021 regarding her referral to [facility] Neurology. This RN checked referrals page. Pt has an active referral for [facility] Neurosurgery [facility] that was placed on 2/23/2021 and a new referral for [facility] Neurosurgery that was placed on 3/31/2021. The referral to [facility] neurosurgery is still pending authorization. Left VM requesting a call back from the patient. Patient message also sent to patient.

## 2021-04-09 ENCOUNTER — TELEPHONE (OUTPATIENT)
Dept: MEDICAL GROUP | Facility: MEDICAL CENTER | Age: 66
End: 2021-04-09

## 2021-04-09 NOTE — TELEPHONE ENCOUNTER
"Patient had lvm this morning asking for a call back from Angi our nurse in the office. I called her and told her that the reason she was trying to get a hold of her was because per Angi \"the referral for [facility]'s Neurosurgery was placed on 3-. Referrals take 7-10 business days to process. They will send her a [employer] message once the referral is processed and approved. \" patient stated this is the 8th day that she has been waiting to hear back and nothing. I mention to her that we don't count weekends only business days. She wanted to give me the Fax number for neurology but I told her we have a referral department that is in charge of all referrals and their process , she ask me to transfer her to referral department which I did    "

## 2021-04-12 ENCOUNTER — TELEPHONE (OUTPATIENT)
Dept: MEDICAL GROUP | Facility: MEDICAL CENTER | Age: 66
End: 2021-04-12

## 2021-04-12 NOTE — TELEPHONE ENCOUNTER
Wants to be referred to Surekha PIZARRO at [facility] for trigger point injection for her abnormal xray of the cervical spine. She wants to know if she really needs to see a neurosurgeon or can she just get the injections.

## 2021-04-15 ENCOUNTER — PATIENT MESSAGE (OUTPATIENT)
Dept: MEDICAL GROUP | Facility: MEDICAL CENTER | Age: 66
End: 2021-04-15

## 2021-04-15 NOTE — TELEPHONE ENCOUNTER
What kind of speciality, is she in a neurosurgery or orthopedic surgery office.?.  Speciality she be specified.

## 2021-04-16 DIAGNOSIS — M79.10 MUSCULAR PAIN: ICD-10-CM

## 2021-04-16 NOTE — TELEPHONE ENCOUNTER
"From: Chloé Cummings  To: Physician Radames Álvarez  Sent: 4/15/2021 5:02 PM PDT  Subject: Procedure Question    Surekha Moscoso, Nurse Practioner, Neurology Dept [facility]  Phone #[phone]    Fax [phone]      ----- Message -----   From:Physician Radames Álvarez   Sent:4/15/2021 4:28 PM PDT   To:Chloé Cummings   Subject:RE: Procedure Question    Referral submitted to neurosurgeon anyway because probably she works with one.  Please send me her fax number and specially.      ----- Message -----   From:Chloé Cummings   Sent:4/15/2021 3:26 PM PDT   To:Physician Radames Álvarez   Subject:Procedure Question    Hi Dr Álvarez, I just discovered I can reach you directly on My Chart!! I've been communicating by those with office staff without resolution.    You referred me to a NEUROSURGEON. I found out that Dr Oneal Gonzalez is highly revered. You sent referral and I went to see him. Turns out I saw a Nurse Practitioner who was very rude and condescending. She sat across the room from me, did not touch or examine me and basically treated me in a condescending manner as if I were a drug-seeking addict. I told her I was NOT THERE FOR NARCOTICS. She discussed trigger point injections and physical therapy with me and I assumed that's the only treatment for me. I left there with a Physical Therapy Referral in hand with \"spondylosis\" as my diagnosis.    I have had trigger point injections before at [facility] about 3 years ago for migraines and would MUCH RATHER see Surekha Webb NP. I would NOT WANT the NP at Dr Gonzalez's to do it.    I requested another referral from your office to see her. It was denied because the referral is for a NEUROSURGEON.    Please tell me:    1) Do I need Surgery? Since I was scheduled with an NP at Dr Gonzalez\"s office I ASSUMED they think I do NOT.      2) if nonsurgical treatment is my option, will you please send Surekha Webb NP at " [facility] Neuro department a referral for treatment? (that doesn't say NEUROSURGERY on it)    I've been suffering with occipital pain and neck pain for many weeks now. Its affecting my entire life, I cluding my job, my sleep and my ability to maintain my daily life.    Thank you!!    Chloé Cummings  [phone]

## 2022-10-12 ENCOUNTER — TELEPHONE (OUTPATIENT)
Dept: HEALTH INFORMATION MANAGEMENT | Facility: OTHER | Age: 67
End: 2022-10-12
Payer: COMMERCIAL

## 2024-04-13 ENCOUNTER — HOSPITAL ENCOUNTER (INPATIENT)
Facility: MEDICAL CENTER | Age: 69
LOS: 2 days | DRG: 988 | End: 2024-04-15
Attending: STUDENT IN AN ORGANIZED HEALTH CARE EDUCATION/TRAINING PROGRAM | Admitting: STUDENT IN AN ORGANIZED HEALTH CARE EDUCATION/TRAINING PROGRAM
Payer: MEDICARE

## 2024-04-13 DIAGNOSIS — S01.80XA AVULSION OF SKIN OF FACE, INITIAL ENCOUNTER: ICD-10-CM

## 2024-04-13 DIAGNOSIS — I42.9 CARDIOMYOPATHY, UNSPECIFIED TYPE (HCC): ICD-10-CM

## 2024-04-13 DIAGNOSIS — I21.4 NSTEMI (NON-ST ELEVATED MYOCARDIAL INFARCTION) (HCC): ICD-10-CM
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
## 2024-04-13 LAB
ABO + RH BLD: NORMAL
ABO GROUP BLD: NORMAL
ALBUMIN SERPL BCP-MCNC: 4.4 G/DL (ref 3.2–4.9)
ALBUMIN/GLOB SERPL: 1.7 G/DL
ALP SERPL-CCNC: 78 U/L (ref 30–99)
ALT SERPL-CCNC: 15 U/L (ref 2–50)
ANION GAP SERPL CALC-SCNC: 10 MMOL/L (ref 7–16)
APTT PPP: 28.8 SEC (ref 24.7–36)
AST SERPL-CCNC: 35 U/L (ref 12–45)
BASOPHILS # BLD AUTO: 0.3 % (ref 0–1.8)
BASOPHILS # BLD: 0.03 K/UL (ref 0–0.12)
BILIRUB SERPL-MCNC: 0.3 MG/DL (ref 0.1–1.5)
BLD GP AB SCN SERPL QL: NORMAL
BUN SERPL-MCNC: 12 MG/DL (ref 8–22)
CALCIUM ALBUM COR SERPL-MCNC: 8.9 MG/DL (ref 8.5–10.5)
CALCIUM SERPL-MCNC: 9.2 MG/DL (ref 8.5–10.5)
CHLORIDE SERPL-SCNC: 108 MMOL/L (ref 96–112)
CO2 SERPL-SCNC: 21 MMOL/L (ref 20–33)
CREAT SERPL-MCNC: 0.49 MG/DL (ref 0.5–1.4)
EKG IMPRESSION: NORMAL
EOSINOPHIL # BLD AUTO: 0.02 K/UL (ref 0–0.51)
EOSINOPHIL NFR BLD: 0.2 % (ref 0–6.9)
ERYTHROCYTE [DISTWIDTH] IN BLOOD BY AUTOMATED COUNT: 42.6 FL (ref 35.9–50)
ETHANOL BLD-MCNC: <10.1 MG/DL
GFR SERPLBLD CREATININE-BSD FMLA CKD-EPI: 102 ML/MIN/1.73 M 2
GLOBULIN SER CALC-MCNC: 2.6 G/DL (ref 1.9–3.5)
GLUCOSE SERPL-MCNC: 96 MG/DL (ref 65–99)
HCG SERPL QL: NEGATIVE
HCT VFR BLD AUTO: 41 % (ref 37–47)
HGB BLD-MCNC: 13.9 G/DL (ref 12–16)
IMM GRANULOCYTES # BLD AUTO: 0.05 K/UL (ref 0–0.11)
IMM GRANULOCYTES NFR BLD AUTO: 0.5 % (ref 0–0.9)
INR PPP: 0.93 (ref 0.87–1.13)
LYMPHOCYTES # BLD AUTO: 2.07 K/UL (ref 1–4.8)
LYMPHOCYTES NFR BLD: 20.4 % (ref 22–41)
MCH RBC QN AUTO: 30.4 PG (ref 27–33)
MCHC RBC AUTO-ENTMCNC: 33.9 G/DL (ref 32.2–35.5)
MCV RBC AUTO: 89.7 FL (ref 81.4–97.8)
MONOCYTES # BLD AUTO: 0.52 K/UL (ref 0–0.85)
MONOCYTES NFR BLD AUTO: 5.1 % (ref 0–13.4)
NEUTROPHILS # BLD AUTO: 7.44 K/UL (ref 1.82–7.42)
NEUTROPHILS NFR BLD: 73.5 % (ref 44–72)
NRBC # BLD AUTO: 0 K/UL
NRBC BLD-RTO: 0 /100 WBC (ref 0–0.2)
PLATELET # BLD AUTO: 262 K/UL (ref 164–446)
PMV BLD AUTO: 10.2 FL (ref 9–12.9)
POTASSIUM SERPL-SCNC: 3.9 MMOL/L (ref 3.6–5.5)
PROT SERPL-MCNC: 7 G/DL (ref 6–8.2)
PROTHROMBIN TIME: 12.6 SEC (ref 12–14.6)
RBC # BLD AUTO: 4.57 M/UL (ref 4.2–5.4)
RH BLD: NORMAL
SODIUM SERPL-SCNC: 139 MMOL/L (ref 135–145)
TROPONIN T SERPL-MCNC: 928 NG/L (ref 6–19)
WBC # BLD AUTO: 10.1 K/UL (ref 4.8–10.8)

## 2024-04-13 PROCEDURE — 36415 COLL VENOUS BLD VENIPUNCTURE: CPT

## 2024-04-13 PROCEDURE — 84703 CHORIONIC GONADOTROPIN ASSAY: CPT

## 2024-04-13 PROCEDURE — A9270 NON-COVERED ITEM OR SERVICE: HCPCS | Performed by: STUDENT IN AN ORGANIZED HEALTH CARE EDUCATION/TRAINING PROGRAM

## 2024-04-13 PROCEDURE — 99285 EMERGENCY DEPT VISIT HI MDM: CPT

## 2024-04-13 PROCEDURE — 93005 ELECTROCARDIOGRAM TRACING: CPT | Performed by: STUDENT IN AN ORGANIZED HEALTH CARE EDUCATION/TRAINING PROGRAM

## 2024-04-13 PROCEDURE — 85730 THROMBOPLASTIN TIME PARTIAL: CPT

## 2024-04-13 PROCEDURE — 86901 BLOOD TYPING SEROLOGIC RH(D): CPT

## 2024-04-13 PROCEDURE — 80053 COMPREHEN METABOLIC PANEL: CPT

## 2024-04-13 PROCEDURE — 305948 HCHG GREEN TRAUMA ACT PRE-NOTIFY NO CC

## 2024-04-13 PROCEDURE — 700111 HCHG RX REV CODE 636 W/ 250 OVERRIDE (IP): Performed by: STUDENT IN AN ORGANIZED HEALTH CARE EDUCATION/TRAINING PROGRAM

## 2024-04-13 PROCEDURE — 85610 PROTHROMBIN TIME: CPT

## 2024-04-13 PROCEDURE — 86850 RBC ANTIBODY SCREEN: CPT

## 2024-04-13 PROCEDURE — 84484 ASSAY OF TROPONIN QUANT: CPT

## 2024-04-13 PROCEDURE — 96375 TX/PRO/DX INJ NEW DRUG ADDON: CPT

## 2024-04-13 PROCEDURE — 96365 THER/PROPH/DIAG IV INF INIT: CPT

## 2024-04-13 PROCEDURE — 85520 HEPARIN ASSAY: CPT

## 2024-04-13 PROCEDURE — 700101 HCHG RX REV CODE 250: Performed by: STUDENT IN AN ORGANIZED HEALTH CARE EDUCATION/TRAINING PROGRAM

## 2024-04-13 PROCEDURE — 93005 ELECTROCARDIOGRAM TRACING: CPT

## 2024-04-13 PROCEDURE — 770020 HCHG ROOM/CARE - TELE (206)

## 2024-04-13 PROCEDURE — 83036 HEMOGLOBIN GLYCOSYLATED A1C: CPT

## 2024-04-13 PROCEDURE — 85025 COMPLETE CBC W/AUTO DIFF WBC: CPT

## 2024-04-13 PROCEDURE — 82077 ASSAY SPEC XCP UR&BREATH IA: CPT

## 2024-04-13 PROCEDURE — 700102 HCHG RX REV CODE 250 W/ 637 OVERRIDE(OP): Performed by: STUDENT IN AN ORGANIZED HEALTH CARE EDUCATION/TRAINING PROGRAM

## 2024-04-13 PROCEDURE — 86900 BLOOD TYPING SEROLOGIC ABO: CPT

## 2024-04-13 RX ORDER — HEPARIN SODIUM 5000 [USP'U]/100ML
0-30 INJECTION, SOLUTION INTRAVENOUS CONTINUOUS
Status: DISCONTINUED | OUTPATIENT
Start: 2024-04-13 | End: 2024-04-14

## 2024-04-13 RX ORDER — TOPIRAMATE 25 MG/1
25 TABLET ORAL DAILY
COMMUNITY

## 2024-04-13 RX ORDER — MORPHINE SULFATE 4 MG/ML
4 INJECTION INTRAVENOUS ONCE
Status: COMPLETED | OUTPATIENT
Start: 2024-04-13 | End: 2024-04-13

## 2024-04-13 RX ORDER — HEPARIN SODIUM 1000 [USP'U]/ML
30 INJECTION, SOLUTION INTRAVENOUS; SUBCUTANEOUS PRN
Status: DISCONTINUED | OUTPATIENT
Start: 2024-04-13 | End: 2024-04-14

## 2024-04-13 RX ORDER — ASPIRIN 81 MG/1
324 TABLET, CHEWABLE ORAL ONCE
Status: COMPLETED | OUTPATIENT
Start: 2024-04-13 | End: 2024-04-13

## 2024-04-13 RX ORDER — HEPARIN SODIUM 1000 [USP'U]/ML
60 INJECTION, SOLUTION INTRAVENOUS; SUBCUTANEOUS ONCE
Status: COMPLETED | OUTPATIENT
Start: 2024-04-13 | End: 2024-04-13

## 2024-04-13 RX ORDER — DEXTROAMPHETAMINE SACCHARATE, AMPHETAMINE ASPARTATE, DEXTROAMPHETAMINE SULFATE AND AMPHETAMINE SULFATE 2.5; 2.5; 2.5; 2.5 MG/1; MG/1; MG/1; MG/1
5 TABLET ORAL SEE ADMIN INSTRUCTIONS
COMMUNITY

## 2024-04-13 RX ADMIN — MORPHINE SULFATE 4 MG: 4 INJECTION INTRAVENOUS at 22:18

## 2024-04-13 RX ADMIN — Medication 3 ML: at 23:45

## 2024-04-13 RX ADMIN — ASPIRIN 324 MG: 81 TABLET, CHEWABLE ORAL at 23:24

## 2024-04-13 RX ADMIN — HEPARIN SODIUM 12 UNITS/KG/HR: 5000 INJECTION, SOLUTION INTRAVENOUS at 23:30

## 2024-04-13 RX ADMIN — HEPARIN SODIUM 3700 UNITS: 1000 INJECTION, SOLUTION INTRAVENOUS; SUBCUTANEOUS at 23:24

## 2024-04-14 ENCOUNTER — APPOINTMENT (OUTPATIENT)
Dept: CARDIOLOGY | Facility: MEDICAL CENTER | Age: 69
DRG: 988 | End: 2024-04-14
Attending: NURSE PRACTITIONER
Payer: MEDICARE
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
## 2024-04-14 LAB
APTT PPP: 31.1 SEC (ref 24.7–36)
CHOLEST SERPL-MCNC: 193 MG/DL (ref 100–199)
EST. AVERAGE GLUCOSE BLD GHB EST-MCNC: 108 MG/DL
HBA1C MFR BLD: 5.4 % (ref 4–5.6)
HDLC SERPL-MCNC: 80 MG/DL
INR PPP: 1.02 (ref 0.87–1.13)
LDLC SERPL CALC-MCNC: 106 MG/DL
LV EJECT FRACT  99904: 45
LV EJECT FRACT MOD 2C 99903: 46.91
LV EJECT FRACT MOD 4C 99902: 58.03
LV EJECT FRACT MOD BP 99901: 56.12
PROTHROMBIN TIME: 13.6 SEC (ref 12–14.6)
TRIGL SERPL-MCNC: 36 MG/DL (ref 0–149)
TROPONIN T SERPL-MCNC: 525 NG/L (ref 6–19)
TROPONIN T SERPL-MCNC: 955 NG/L (ref 6–19)
UFH PPP CHRO-ACNC: 0.11 IU/ML
UFH PPP CHRO-ACNC: 0.42 IU/ML
UFH PPP CHRO-ACNC: 0.49 IU/ML
UFH PPP CHRO-ACNC: <0.1 IU/ML

## 2024-04-14 PROCEDURE — 93458 L HRT ARTERY/VENTRICLE ANGIO: CPT | Mod: 26 | Performed by: INTERNAL MEDICINE

## 2024-04-14 PROCEDURE — 84484 ASSAY OF TROPONIN QUANT: CPT | Mod: 91

## 2024-04-14 PROCEDURE — 700111 HCHG RX REV CODE 636 W/ 250 OVERRIDE (IP): Performed by: STUDENT IN AN ORGANIZED HEALTH CARE EDUCATION/TRAINING PROGRAM

## 2024-04-14 PROCEDURE — 80061 LIPID PANEL: CPT

## 2024-04-14 PROCEDURE — 700102 HCHG RX REV CODE 250 W/ 637 OVERRIDE(OP): Performed by: HOSPITALIST

## 2024-04-14 PROCEDURE — 0KQ10ZZ REPAIR FACIAL MUSCLE, OPEN APPROACH: ICD-10-PCS | Performed by: PLASTIC SURGERY

## 2024-04-14 PROCEDURE — A9270 NON-COVERED ITEM OR SERVICE: HCPCS | Performed by: STUDENT IN AN ORGANIZED HEALTH CARE EDUCATION/TRAINING PROGRAM

## 2024-04-14 PROCEDURE — 36415 COLL VENOUS BLD VENIPUNCTURE: CPT

## 2024-04-14 PROCEDURE — 99222 1ST HOSP IP/OBS MODERATE 55: CPT | Mod: 25,FS | Performed by: INTERNAL MEDICINE

## 2024-04-14 PROCEDURE — 85520 HEPARIN ASSAY: CPT

## 2024-04-14 PROCEDURE — 93306 TTE W/DOPPLER COMPLETE: CPT

## 2024-04-14 PROCEDURE — 95012 NITRIC OXIDE EXP GAS DETER: CPT | Performed by: INTERNAL MEDICINE

## 2024-04-14 PROCEDURE — 99152 MOD SED SAME PHYS/QHP 5/>YRS: CPT

## 2024-04-14 PROCEDURE — 700105 HCHG RX REV CODE 258: Performed by: HOSPITALIST

## 2024-04-14 PROCEDURE — 99291 CRITICAL CARE FIRST HOUR: CPT | Performed by: STUDENT IN AN ORGANIZED HEALTH CARE EDUCATION/TRAINING PROGRAM

## 2024-04-14 PROCEDURE — 4A023N7 MEASUREMENT OF CARDIAC SAMPLING AND PRESSURE, LEFT HEART, PERCUTANEOUS APPROACH: ICD-10-PCS | Performed by: INTERNAL MEDICINE

## 2024-04-14 PROCEDURE — B211YZZ FLUOROSCOPY OF MULTIPLE CORONARY ARTERIES USING OTHER CONTRAST: ICD-10-PCS | Performed by: INTERNAL MEDICINE

## 2024-04-14 PROCEDURE — A9270 NON-COVERED ITEM OR SERVICE: HCPCS | Performed by: HOSPITALIST

## 2024-04-14 PROCEDURE — 770020 HCHG ROOM/CARE - TELE (206)

## 2024-04-14 PROCEDURE — 99232 SBSQ HOSP IP/OBS MODERATE 35: CPT | Performed by: NURSE PRACTITIONER

## 2024-04-14 PROCEDURE — 96375 TX/PRO/DX INJ NEW DRUG ADDON: CPT

## 2024-04-14 PROCEDURE — A9270 NON-COVERED ITEM OR SERVICE: HCPCS

## 2024-04-14 PROCEDURE — 96366 THER/PROPH/DIAG IV INF ADDON: CPT

## 2024-04-14 PROCEDURE — 700111 HCHG RX REV CODE 636 W/ 250 OVERRIDE (IP): Mod: JZ | Performed by: HOSPITALIST

## 2024-04-14 PROCEDURE — 700101 HCHG RX REV CODE 250: Performed by: PLASTIC SURGERY

## 2024-04-14 PROCEDURE — 700117 HCHG RX CONTRAST REV CODE 255: Performed by: INTERNAL MEDICINE

## 2024-04-14 PROCEDURE — 93306 TTE W/DOPPLER COMPLETE: CPT | Mod: 26 | Performed by: INTERNAL MEDICINE

## 2024-04-14 PROCEDURE — 700102 HCHG RX REV CODE 250 W/ 637 OVERRIDE(OP): Performed by: STUDENT IN AN ORGANIZED HEALTH CARE EDUCATION/TRAINING PROGRAM

## 2024-04-14 PROCEDURE — 700101 HCHG RX REV CODE 250

## 2024-04-14 PROCEDURE — 700111 HCHG RX REV CODE 636 W/ 250 OVERRIDE (IP): Mod: JG

## 2024-04-14 PROCEDURE — 700102 HCHG RX REV CODE 250 W/ 637 OVERRIDE(OP)

## 2024-04-14 RX ORDER — ACETAMINOPHEN 325 MG/1
650 TABLET ORAL EVERY 6 HOURS PRN
Status: DISCONTINUED | OUTPATIENT
Start: 2024-04-14 | End: 2024-04-15 | Stop reason: HOSPADM

## 2024-04-14 RX ORDER — LIDOCAINE HYDROCHLORIDE AND EPINEPHRINE 10; 10 MG/ML; UG/ML
10 INJECTION, SOLUTION INFILTRATION; PERINEURAL ONCE
Status: COMPLETED | OUTPATIENT
Start: 2024-04-14 | End: 2024-04-14

## 2024-04-14 RX ORDER — ATORVASTATIN CALCIUM 40 MG/1
40 TABLET, FILM COATED ORAL EVERY EVENING
Status: DISCONTINUED | OUTPATIENT
Start: 2024-04-14 | End: 2024-04-15 | Stop reason: HOSPADM

## 2024-04-14 RX ORDER — LIDOCAINE HYDROCHLORIDE 20 MG/ML
INJECTION, SOLUTION INFILTRATION; PERINEURAL
Status: COMPLETED
Start: 2024-04-14 | End: 2024-04-14

## 2024-04-14 RX ORDER — MIDAZOLAM HYDROCHLORIDE 1 MG/ML
INJECTION INTRAMUSCULAR; INTRAVENOUS
Status: COMPLETED
Start: 2024-04-14 | End: 2024-04-14

## 2024-04-14 RX ORDER — MORPHINE SULFATE 4 MG/ML
4 INJECTION INTRAVENOUS EVERY 4 HOURS PRN
Status: DISCONTINUED | OUTPATIENT
Start: 2024-04-14 | End: 2024-04-15 | Stop reason: HOSPADM

## 2024-04-14 RX ORDER — SODIUM CHLORIDE 9 MG/ML
INJECTION, SOLUTION INTRAVENOUS CONTINUOUS
Status: DISCONTINUED | OUTPATIENT
Start: 2024-04-14 | End: 2024-04-15

## 2024-04-14 RX ORDER — TRAMADOL HYDROCHLORIDE 50 MG/1
50 TABLET ORAL EVERY 6 HOURS PRN
Status: DISCONTINUED | OUTPATIENT
Start: 2024-04-14 | End: 2024-04-15 | Stop reason: HOSPADM

## 2024-04-14 RX ORDER — ONDANSETRON 2 MG/ML
INJECTION INTRAMUSCULAR; INTRAVENOUS
Status: COMPLETED
Start: 2024-04-14 | End: 2024-04-14

## 2024-04-14 RX ORDER — METOPROLOL SUCCINATE 25 MG/1
25 TABLET, EXTENDED RELEASE ORAL
Status: DISCONTINUED | OUTPATIENT
Start: 2024-04-15 | End: 2024-04-15 | Stop reason: HOSPADM

## 2024-04-14 RX ORDER — ASPIRIN 81 MG/1
81 TABLET, CHEWABLE ORAL DAILY
Status: DISCONTINUED | OUTPATIENT
Start: 2024-04-14 | End: 2024-04-14

## 2024-04-14 RX ORDER — VERAPAMIL HYDROCHLORIDE 2.5 MG/ML
INJECTION, SOLUTION INTRAVENOUS
Status: COMPLETED
Start: 2024-04-14 | End: 2024-04-14

## 2024-04-14 RX ORDER — TOPIRAMATE 25 MG/1
25 TABLET ORAL DAILY
Status: DISCONTINUED | OUTPATIENT
Start: 2024-04-14 | End: 2024-04-15 | Stop reason: HOSPADM

## 2024-04-14 RX ORDER — ONDANSETRON 2 MG/ML
4 INJECTION INTRAMUSCULAR; INTRAVENOUS EVERY 4 HOURS PRN
Status: DISCONTINUED | OUTPATIENT
Start: 2024-04-14 | End: 2024-04-15 | Stop reason: HOSPADM

## 2024-04-14 RX ORDER — HEPARIN SODIUM 200 [USP'U]/100ML
INJECTION, SOLUTION INTRAVENOUS
Status: COMPLETED
Start: 2024-04-14 | End: 2024-04-14

## 2024-04-14 RX ORDER — HEPARIN SODIUM 1000 [USP'U]/ML
INJECTION, SOLUTION INTRAVENOUS; SUBCUTANEOUS
Status: COMPLETED
Start: 2024-04-14 | End: 2024-04-14

## 2024-04-14 RX ORDER — LIDOCAINE HYDROCHLORIDE 20 MG/ML
15 SOLUTION OROPHARYNGEAL
Status: COMPLETED | OUTPATIENT
Start: 2024-04-14 | End: 2024-04-15

## 2024-04-14 RX ADMIN — LIDOCAINE HYDROCHLORIDE AND EPINEPHRINE 10 ML: 10; 10 INJECTION, SOLUTION INFILTRATION; PERINEURAL at 08:45

## 2024-04-14 RX ADMIN — HEPARIN SODIUM: 1000 INJECTION, SOLUTION INTRAVENOUS; SUBCUTANEOUS at 15:28

## 2024-04-14 RX ADMIN — AMPICILLIN AND SULBACTAM 3 G: 1; 2 INJECTION, POWDER, FOR SOLUTION INTRAMUSCULAR; INTRAVENOUS at 14:43

## 2024-04-14 RX ADMIN — HEPARIN SODIUM 2000 UNITS: 200 INJECTION, SOLUTION INTRAVENOUS at 15:27

## 2024-04-14 RX ADMIN — SODIUM CHLORIDE: 9 INJECTION, SOLUTION INTRAVENOUS at 07:52

## 2024-04-14 RX ADMIN — ASPIRIN 81 MG: 81 TABLET, CHEWABLE ORAL at 05:28

## 2024-04-14 RX ADMIN — NITROGLYCERIN: 20 INJECTION INTRAVENOUS at 15:15

## 2024-04-14 RX ADMIN — HEPARIN SODIUM 1800 UNITS: 1000 INJECTION, SOLUTION INTRAVENOUS; SUBCUTANEOUS at 13:03

## 2024-04-14 RX ADMIN — LIDOCAINE HYDROCHLORIDE: 20 INJECTION, SOLUTION INFILTRATION; PERINEURAL at 15:28

## 2024-04-14 RX ADMIN — FENTANYL CITRATE 100 MCG: 50 INJECTION, SOLUTION INTRAMUSCULAR; INTRAVENOUS at 15:42

## 2024-04-14 RX ADMIN — ACETAMINOPHEN 650 MG: 325 TABLET ORAL at 04:32

## 2024-04-14 RX ADMIN — AMPICILLIN AND SULBACTAM 3 G: 1; 2 INJECTION, POWDER, FOR SOLUTION INTRAMUSCULAR; INTRAVENOUS at 19:59

## 2024-04-14 RX ADMIN — MORPHINE SULFATE 4 MG: 4 INJECTION INTRAVENOUS at 08:09

## 2024-04-14 RX ADMIN — MIDAZOLAM HYDROCHLORIDE 2 MG: 2 INJECTION, SOLUTION INTRAMUSCULAR; INTRAVENOUS at 15:42

## 2024-04-14 RX ADMIN — VERAPAMIL HYDROCHLORIDE 5 MG: 2.5 INJECTION INTRAVENOUS at 15:28

## 2024-04-14 RX ADMIN — TOPIRAMATE 25 MG: 25 TABLET, FILM COATED ORAL at 19:56

## 2024-04-14 RX ADMIN — MORPHINE SULFATE 4 MG: 4 INJECTION INTRAVENOUS at 11:59

## 2024-04-14 RX ADMIN — ATORVASTATIN CALCIUM 40 MG: 40 TABLET, FILM COATED ORAL at 17:39

## 2024-04-14 RX ADMIN — ONDANSETRON 4 MG: 2 INJECTION INTRAMUSCULAR; INTRAVENOUS at 15:44

## 2024-04-14 RX ADMIN — TRAMADOL HYDROCHLORIDE 50 MG: 50 TABLET ORAL at 17:48

## 2024-04-14 RX ADMIN — IOHEXOL 20 ML: 350 INJECTION, SOLUTION INTRAVENOUS at 15:42

## 2024-04-14 ASSESSMENT — ENCOUNTER SYMPTOMS
FOCAL WEAKNESS: 0
SPEECH CHANGE: 0
PSYCHIATRIC NEGATIVE: 1
NECK PAIN: 0
NEUROLOGICAL NEGATIVE: 1
SHORTNESS OF BREATH: 0
MUSCULOSKELETAL NEGATIVE: 1
FEVER: 0
TINGLING: 0
VOMITING: 0
BLURRED VISION: 0
HEADACHES: 1
MYALGIAS: 0
BACK PAIN: 0
ABDOMINAL PAIN: 0
PALPITATIONS: 0
CHILLS: 0
CHEST TIGHTNESS: 0
SENSORY CHANGE: 0
DIZZINESS: 0
GASTROINTESTINAL NEGATIVE: 1
NAUSEA: 0
CONSTITUTIONAL NEGATIVE: 1
RESPIRATORY NEGATIVE: 1
DOUBLE VISION: 0

## 2024-04-14 ASSESSMENT — LIFESTYLE VARIABLES
CONSUMPTION TOTAL: NEGATIVE
AVERAGE NUMBER OF DAYS PER WEEK YOU HAVE A DRINK CONTAINING ALCOHOL: 0
HOW MANY TIMES IN THE PAST YEAR HAVE YOU HAD 5 OR MORE DRINKS IN A DAY: 0
EVER FELT BAD OR GUILTY ABOUT YOUR DRINKING: NO
HAVE YOU EVER FELT YOU SHOULD CUT DOWN ON YOUR DRINKING: NO
ALCOHOL_USE: NO
TOTAL SCORE: 0
EVER HAD A DRINK FIRST THING IN THE MORNING TO STEADY YOUR NERVES TO GET RID OF A HANGOVER: NO
SUBSTANCE_ABUSE: 0
DOES PATIENT WANT TO STOP DRINKING: NO
TOTAL SCORE: 0
ON A TYPICAL DAY WHEN YOU DRINK ALCOHOL HOW MANY DRINKS DO YOU HAVE: 0
TOTAL SCORE: 0
HAVE PEOPLE ANNOYED YOU BY CRITICIZING YOUR DRINKING: NO

## 2024-04-14 ASSESSMENT — COGNITIVE AND FUNCTIONAL STATUS - GENERAL
MOBILITY SCORE: 24
SUGGESTED CMS G CODE MODIFIER MOBILITY: CH
SUGGESTED CMS G CODE MODIFIER DAILY ACTIVITY: CH
DAILY ACTIVITIY SCORE: 24

## 2024-04-14 ASSESSMENT — PATIENT HEALTH QUESTIONNAIRE - PHQ9
1. LITTLE INTEREST OR PLEASURE IN DOING THINGS: NOT AT ALL
2. FEELING DOWN, DEPRESSED, IRRITABLE, OR HOPELESS: NOT AT ALL
SUM OF ALL RESPONSES TO PHQ9 QUESTIONS 1 AND 2: 0

## 2024-04-14 ASSESSMENT — PAIN DESCRIPTION - PAIN TYPE
TYPE: ACUTE PAIN

## 2024-04-14 NOTE — H&P
Hospital Medicine History & Physical Note    Date of Service  4/14/2024    Primary Care Physician  Tino Vanessa M.D.    Consultants  Plastic surgery  Cardiology    Code Status  Full Code    Chief Complaint  Chief Complaint   Patient presents with    Trauma Green     Trauma green tx from [facility] for a dog bit that occurred at home, pt states it was her own dog that bit her R upper lip     Pt states she had sudden onset of chest pain after the dog bite occurred, pt was noted to have elevated Troponin PTA at [facility], pt denies any cardiac hx    PTA:  0.5mg ativan  15mg toradol  4mg zofran  4mg morphine    Pt states she received her tetanus PTA       History of Presenting Illness  Chloé Cummings is a 68 y.o. female who presented 4/13/2024 with a dog bite.  Patient was at a barbecue this afternoon.  She was petting a dog with a muzzle when all of a sudden the dog bit her in the upper lip.  She noted excruciating pain after and had a part of her right upper lip torn off.  She went to outside facility ER for evaluation.  As she was on the way, she began to have a severe headache, dizziness, sharp chest pain.  Code STEMI was activated at outside facility, cardiology team canceled code STEMI and patient was then transferred to [facility].    At [facility] ED, patient's initial troponin was 928.  EKG showing normal sinus rhythm with mild ST depression in the lateral leads.  Cardiology consulted, recommended load with aspirin starting on heparin drip.    I discussed the plan of care with patient.    Review of Systems  Review of Systems   Constitutional: Negative.    HENT: Negative.     Respiratory: Negative.     Cardiovascular:  Positive for chest pain.   Gastrointestinal: Negative.    Genitourinary: Negative.    Musculoskeletal: Negative.    Skin: Negative.    Neurological: Negative.    Endo/Heme/Allergies: Negative.    Psychiatric/Behavioral: Negative.         Past Medical History   has a past medical history of Bipolar 2 disorder (HCC),  Herpes genitalis in women, Insomnia, and Migraines.    Surgical History   has a past surgical history that includes craniotomy (2011); arthroscopy, knee; and shoulder arthroscopy.     Family History  family history includes Cancer in her father; Heart Attack in her maternal grandmother and mother; Stroke in her mother and sister.   Family history reviewed with patient. There is no family history that is pertinent to the chief complaint.     Social History   reports that she has never smoked. She has never used smokeless tobacco. She reports that she does not drink alcohol and does not use drugs.    Allergies  Allergies   Allergen Reactions    Nkda [No Known Drug Allergy]        Medications  Prior to Admission Medications   Prescriptions Last Dose Informant Patient Reported? Taking?   amphetamine-dextroamphetamine (ADDERALL) 10 MG Tab 4/13/2024 at 0900  Yes Yes   Sig: Take 5 mg by mouth see administration instructions. 10 mg = AM   10 mg = afternoon if needed   topiramate (TOPAMAX) 25 MG Tab 4/10/2024 at 0900  Yes Yes   Sig: Take 25 mg by mouth every day.      Facility-Administered Medications: None       Physical Exam  Pulse:  [78-83] 83  Resp:  [10] 10  BP: (116-142)/(46-78) 131/60  SpO2:  [93 %-95 %] 95 %  Blood Pressure : 131/60       Pulse: 83   Respiration: (!) 10   Pulse Oximetry: 95 %       Physical Exam  Constitutional:       Appearance: Normal appearance. She is normal weight.   HENT:      Head: Normocephalic.      Nose: Nose normal.      Mouth/Throat:      Comments: A part of patient's right upper lip torn off  Eyes:      Pupils: Pupils are equal, round, and reactive to light.   Cardiovascular:      Rate and Rhythm: Normal rate.      Pulses: Normal pulses.   Pulmonary:      Effort: Pulmonary effort is normal.      Breath sounds: Normal breath sounds.   Abdominal:      General: Abdomen is flat. Bowel sounds are normal.      Palpations: Abdomen is soft.   Musculoskeletal:         General: Normal range of  "motion.      Cervical back: Neck supple.   Skin:     General: Skin is warm.   Neurological:      General: No focal deficit present.      Mental Status: She is alert and oriented to person, place, and time. Mental status is at baseline.   Psychiatric:         Mood and Affect: Mood normal.         Behavior: Behavior normal.         Thought Content: Thought content normal.         Judgment: Judgment normal.         Laboratory:  Recent Labs     04/13/24 2142   WBC 10.1   RBC 4.57   HEMOGLOBIN 13.9   HEMATOCRIT 41.0   MCV 89.7   MCH 30.4   MCHC 33.9   RDW 42.6   PLATELETCT 262   MPV 10.2     Recent Labs     04/13/24 2142   SODIUM 139   POTASSIUM 3.9   CHLORIDE 108   CO2 21   GLUCOSE 96   BUN 12   CREATININE 0.49*   CALCIUM 9.2     Recent Labs     04/13/24 2142   ALTSGPT 15   ASTSGOT 35   ALKPHOSPHAT 78   TBILIRUBIN 0.3   GLUCOSE 96     Recent Labs     04/13/24 2142 04/13/24  2317   APTT 28.8 31.1   INR 0.93 1.02     No results for input(s): \"NTPROBNP\" in the last 72 hours.      Recent Labs     04/13/24 2142   TROPONINT 928*       Imaging:  EC-ECHOCARDIOGRAM COMPLETE W/O CONT    (Results Pending)       EKG:  I have personally reviewed the images and compared with prior images.    Assessment/Plan:  Justification for Admission Status  I anticipate this patient will require at least two midnights for appropriate medical management, necessitating inpatient admission because patient has NSTEMI    Patient will need a Telemetry bed on MEDICAL service .  The need is secondary to NSTEMI  .    * NSTEMI (non-ST elevated myocardial infarction) (HCC)- (present on admission)  Assessment & Plan  Noted to have an episode of chest pain shortly after she was bitten by dog.  Initial troponin 928 and EKG showing mild ST depression in lateral leads.  Currently symptom-free.  Suspect stress cardiomyopathy rather than ACS.  Patient has had a lot of stressors in her life, including work and death of family members recently.  No previous " cardiac history.  ACS should be ruled out  Cardiology on board, cardiac catheterization in a.m.  Loaded with aspirin and start heparin drip        Dog bite  Assessment & Plan  Sustained a dog bite to the right upper lip, a part of her lip was torn off  Topical lidocaine as needed (reports great symptomatic relief from this)  Given tetanus shot at outside facility  Plastic surgery consulted by ERP, no plans for intervention until after ACS workup      Patient is critically ill.   The patient continues to have: NSTEMI  The vital organ system that is affected is the: Heart  If untreated there is a high chance of deterioration into: STEMI, V. tach, V-fib  And eventually death.   The critical care that I am providing today is: Heparin drip, aspirin loading  The critical that has been undertaken is medically complex.   There has been no overlap in critical care time.   Critical Care Time not including procedures: 31      VTE prophylaxis: therapeutic anticoagulation with heparin

## 2024-04-14 NOTE — PROGRESS NOTES
This is 68-year-old female with a past medical history significant for migraine presented to the ER on 4/13/2022 after her dog bit her face.  She was initially seen at [facility], transferred to [facility] for possible STEMI.  Dr Durbin has evaluated and aborted STEMI call.  Upon presentation in ER, patient troponin is elevated at 928, 955.  EKG showed ST elevation in V2 along with inferior ST depression.      Regarding her facial avulsion, plastic surgery Bj Galan was consulted, status post repair of upper lip avulsion injury.  Patient will follow-up with Dr. Galan as an op.    Cardiology was consulted, plan for cardiac cath.  Patient was started on heparin drip; will continue     Lip Avulsion   Dog bite  NSTEMI  Migraine headache   Full code     Patient's liver was not was repaired by plastic surgery.  Continue present output, will continue Augmentin, trauma has evaluated, procedure recommendation.  Will continue heparin drip.  Patient is currently n.p.o. for cardiac cath  Provide Tylenol, tramadol    Otherwise agreed with assessment and plan provided by admitting physician.

## 2024-04-14 NOTE — HOSPITAL COURSE
This is 68-year-old female with a past medical history significant for migraine presented to the ER on 4/13/2022 after her dog bit her face.  She was initially seen at [facility], transferred to [facility] for possible STEMI.  Dr Durbin has evaluated and aborted STEMI call.  Upon presentation in ER, patient troponin is elevated at 928, 955.  EKG showed ST elevation in V2 along with inferior ST depression.      Regarding her facial avulsion, plastic surgery Bj Galan was consulted, status post repair of upper lip avulsion injury.  Patient will follow-up with Dr. Galan as an op.    Cardiology was consulted, plan for cardiac cath.  Patient was started on heparin drip; will continue     Lip Avulsion   Dog bite  NSTEMI  Migraine headache   Full code     Patient's liver was not was repaired by plastic surgery.  Continue present output, will continue Augmentin, trauma has evaluated, procedure recommendation.  Will continue heparin drip.  Patient is currently n.p.o. for cardiac cath  Provide Tylenol, tramadol    Otherwise agreed with assessment and plan provided by admitting physician

## 2024-04-14 NOTE — ED NOTES
Pt roomed to josé miguel 17, connected to monitor, labs in processing. Pt is awake and alert holding a gauze pad to her lip. On room air.

## 2024-04-14 NOTE — CONSULTS
Cardiology Initial Consultation    Date of Service  4/14/2024    Referring Physician  Cameron Mata M.D.    Reason for Consultation  Chest pain; elevated troponins    History of Presenting Illness  Chloé Cummings is a 68 y.o. female with a past medical history of migraines who presented 4/13/2024 with evaluation after dog bite to her face.  Initially seen at [facility], was transferred to [facility] for possible STEMI which was de-escalated by Dr. Durbin.  Patient reports she experienced severe pain as well as headache, then developed severe midsternal pressure chest pain which did not improve until she received lidocaine for her facial tissue injury.    Patient had SVT workup with Dr. Santana 8 years ago at [facility], but was told normal findings with no reoccurrence.    Troponins remain elevated 928-> 955.  Initial EKG for [facility] shows sinus rhythm with ST elevation in V2, with inferior ST depression.  Patient denies chest pain currently.    Patient denies history of tobacco use, EtOH, drug use.  Patient denies family history of premature CAD.  History of lung cancer and hemorrhagic stroke.    The risks, benefits, and alternatives to coronary angiography with IV sedation were discussed in great detail. Specific risks mentioned include bleeding, infection, kidney damage, allergic reaction, cardiac perforation with possible tamponade requiring pericardiocentesis or possibly open heart surgery. In addition, we discussed that 10% of patients will experience small to moderate bruising at the site of the arterial puncture. Lastly, the risks of heart attack, stroke and death were discussed; the risk of major complications such as heart attack or stroke caused by the angiogram is approximately 1%; the risk of death is approximately 1 in 1000. We also discussed to possibility of DAPT post-procedure. The patient verbalized understanding of the potential complications and wishes to proceed with this procedure.     Review of  Systems  Review of Systems   Respiratory:  Negative for chest tightness and shortness of breath.    Cardiovascular:  Negative for chest pain, palpitations and leg swelling.   Neurological:  Positive for headaches.       Past Medical History   has a past medical history of Bipolar 2 disorder (HCC), Herpes genitalis in women, Insomnia, and Migraines.    Surgical History   has a past surgical history that includes craniotomy (2011); arthroscopy, knee; and shoulder arthroscopy.    Family History  family history includes Cancer in her father; Heart Attack in her maternal grandmother and mother; Stroke in her mother and sister.    Social History   reports that she has never smoked. She has never used smokeless tobacco. She reports that she does not drink alcohol and does not use drugs.    Medications  Prior to Admission Medications   Prescriptions Last Dose Informant Patient Reported? Taking?   amphetamine-dextroamphetamine (ADDERALL) 10 MG Tab 4/13/2024 at 0900  Yes Yes   Sig: Take 5 mg by mouth see administration instructions. 10 mg = AM   10 mg = afternoon if needed   topiramate (TOPAMAX) 25 MG Tab 4/10/2024 at 0900  Yes Yes   Sig: Take 25 mg by mouth every day.      Facility-Administered Medications: None       Allergies  Allergies   Allergen Reactions    Nkda [No Known Drug Allergy]        Vital signs in last 24 hours  Temp:  [36.4 °C (97.6 °F)] 36.4 °C (97.6 °F)  Pulse:  [] 73  Resp:  [10-19] 15  BP: ()/(45-78) 103/58  SpO2:  [91 %-99 %] 92 %    Physical Exam  Physical Exam  Constitutional:       General: She is not in acute distress.     Appearance: Normal appearance.   HENT:      Head: Normocephalic and atraumatic.   Eyes:      Extraocular Movements: Extraocular movements intact.      Conjunctiva/sclera: Conjunctivae normal.   Cardiovascular:      Rate and Rhythm: Normal rate and regular rhythm.      Pulses: Normal pulses.      Heart sounds: Normal heart sounds.   Pulmonary:      Effort: Pulmonary  "effort is normal. No respiratory distress.      Breath sounds: Normal breath sounds.   Musculoskeletal:      Right lower leg: No edema.      Left lower leg: No edema.   Skin:     General: Skin is warm and dry.      Findings: No rash.      Comments: Right upper lip injury currently dressed   Neurological:      General: No focal deficit present.      Mental Status: She is alert and oriented to person, place, and time.         Lab Review  Lab Results   Component Value Date/Time    WBC 10.1 04/13/2024 09:42 PM    RBC 4.57 04/13/2024 09:42 PM    HEMOGLOBIN 13.9 04/13/2024 09:42 PM    HEMATOCRIT 41.0 04/13/2024 09:42 PM    MCV 89.7 04/13/2024 09:42 PM    MCH 30.4 04/13/2024 09:42 PM    MCHC 33.9 04/13/2024 09:42 PM    MPV 10.2 04/13/2024 09:42 PM      Lab Results   Component Value Date/Time    SODIUM 139 04/13/2024 09:42 PM    POTASSIUM 3.9 04/13/2024 09:42 PM    CHLORIDE 108 04/13/2024 09:42 PM    CO2 21 04/13/2024 09:42 PM    GLUCOSE 96 04/13/2024 09:42 PM    BUN 12 04/13/2024 09:42 PM    CREATININE 0.49 (L) 04/13/2024 09:42 PM    BUNCREATRAT 21.4 06/01/2022 01:26 PM      Lab Results   Component Value Date/Time    ASTSGOT 35 04/13/2024 09:42 PM    ALTSGPT 15 04/13/2024 09:42 PM     Lab Results   Component Value Date/Time    CHOLSTRLTOT 193 04/14/2024 12:56 AM     (H) 04/14/2024 12:56 AM    HDL 80 04/14/2024 12:56 AM    TRIGLYCERIDE 36 04/14/2024 12:56 AM    TROPONINT 955 (H) 04/14/2024 12:56 AM       No results for input(s): \"NTPROBNP\" in the last 72 hours.    Cardiac Imaging and Procedures Review  Echocardiogram:  pending    Cardiac Catheterization:  pending    Imaging    Stress Test (1/4/2017):  NUCLEAR IMAGING INTERPRETATION   Negative stress ECG for ischemia.   No evidence of significant jeopardized viable myocardium or prior myocardial    infarction.   Normal myocardial perfusion with no ischemia.   Normal left ventricular size, ejection fraction, and wall motion.   Normal left ventricular wall motion.  LV " ejection fraction = 74%.   ECG INTERPRETATION   Negative stress ECG for ischemia.    Assessment/Plan  NSTEMI; elevated tropes; stressful events recently  -ASA  -Antiocoagulant: Heparin gtt  -NPO for LHC  -HI Statin  -Low threshold for adding beta-blocker therapy  -Echo pending      Thank you for allowing me to participate in the care of this patient.    I will continue to follow this patient    Please contact me with any questions.    Please see Dr. Gonzalez's attestation for further details and MDM.     I personally spent a total of 45 minutes which includes face-to-face time and non-face-to-face time spent on preparing to see the patient, reviewing hospital notes and tests, obtaining history from the patient, performing a medically appropriate exam, counseling and educating the patient, ordering medications/tests/procedures/referrals as clinically indicated, and documenting information in the electronic medical record.    NETO Minaya, HF-CERT   [facility]  [phone]

## 2024-04-14 NOTE — CONSULTS
Plastic Surgery Consult    Chloé Cummings is a pleasant 69F transferred from [facility] for a dog bite that occurred yestereday afternoon. She states that it was her friend's pitbull that was mussled and bit her lip.  Pt also was noted to have elevated Troponin which is being managed as a possible NSTEMI. She is on heparin and antibiotics in the ER. She denies any cardiac hx.          Pt states she received her tetanus PTA        EXTERNAL RECORDS REVIEWED  External ED Note patient transferred from [facility] for a dog bite     HPI/ROS  LIMITATION TO HISTORY   Select: : None  OUTSIDE HISTORIAN(S):  EMS reports patient received 0.5 mg Ativan, 50 mg Toradol, 2 mg Zofran, 2 mg morphine prior to arrival      PAST MEDICAL HISTORY   has a past medical history of Bipolar 2 disorder (HCC), Herpes genitalis in women, Insomnia, and Migraines.     SURGICAL HISTORY   has a past surgical history that includes craniotomy (2011); arthroscopy, knee; and shoulder arthroscopy.     FAMILY HISTORY  Family History  Family History  Problem Relation Age of Onset   Stroke Mother     Heart Attack Mother     Cancer Father          Lung   Stroke Sister     Heart Attack Maternal Grandmother            SOCIAL HISTORY  Social History     Tobacco Use   Smoking status: Never   Smokeless tobacco: Never  Substance and Sexual Activity   Alcohol use: No   Drug use: No   Sexual activity: Not Currently        CURRENT MEDICATIONS  Home Medications   **Home medications have not yet been reviewed for this encounter**           ALLERGIES  Allergies  Allergen Reactions   Nkda [No Known Drug Allergy]          PHYSICAL EXAM  AVSS  Vitals and nursing note reviewed.   Constitutional:       Comments: Patient is lying in bed supine, pleasant, conversant, speaking in complete sentences   HENT:  Large macerated skin avulsion of right upper lip encompassing approx 30% of upper lip crossing into vermillion border, obicularis muscle visualized and generally  intact, entire lip and skin over the alveolar ridge, no tooth or maxillary tenderness   Eyes:      Extraocular Movements: Extraocular movements intact.      Conjunctiva/sclera: Conjunctivae normal.      Pupils: Pupils are equal, round, and reactive to light.   Cardiovascular:      Pulses: Normal pulses.   Pulmonary:      Effort: Pulmonary effort is normal. No respiratory distress.   Musculoskeletal:         General: No swelling. Normal range of motion.      Cervical back: Normal range of motion. No rigidity.   Skin:     General: Skin is warm and dry.      Capillary Refill: Capillary refill takes less than 2 seconds.   Neurological:      Mental Status: Alert.         Procedure: After informed consent was obtained the wound was copiously irrigated. 10cc of 1% lidocaine with epi was injected throughout the lip injury. The lip injury was then closed in layers using a 5-0 vicryrl to reapproximate the obicularis muscle followed by 5-0 nylon and 5-0 chromic sutures to address the mucosa and skin in an interrupted fashion. The patient tolerated this without issue. We dsicussed at length prior to repair that this is a large (5x3x0.5cm) avulsion bite injury with missing tissue. She understands that the goal today is to clean the area and approximate the edges, she will likely require followup for reconstructive as an outpatient. We briefly discussed options including lip flap techniques, wedge excisions, grafting technqiues. She understands and this will be evaluated as an outpatient.     A/P: Chloé Cummings is now s/p repair of upper lip avulsion injury. We reviewed aftercare including continuation of antibiotics, sun avoidance, topical ointment and soft foods. She will followup as outpatient for evaluation and suture removal. I discussed with medical team that any anticoagulation or treatments needed from cardiac standpoint are acceptable in setting of her lip injury.

## 2024-04-14 NOTE — PROGRESS NOTES
Patient is A&Ox4, room air. Placed on tele; confirmed SR 80s. No c/o chest pain; c/o migraine 10/10. Unable to medicate before patient taken to cath lab. Significant other at bedside; both updated on plan of care and oriented to room and call light.

## 2024-04-14 NOTE — PROGRESS NOTES
"      Trauma tertiary and SBIRT per trauma guidelines and quality measures. This note does not constitute as a formal trauma consult. Please defer all management to primary team.     Mental status adequate for full examination?: Yes    Spine cleared (radiologically and/or clinically): Yes    REVIEW OF SYSTEMS:  Review of Systems   Constitutional:  Negative for chills, fever and malaise/fatigue.   HENT:  Negative for hearing loss.    Eyes:  Negative for blurred vision and double vision.   Respiratory:  Negative for shortness of breath.    Cardiovascular:  Positive for chest pain (improved).   Gastrointestinal:  Negative for abdominal pain, nausea and vomiting.   Genitourinary:  Negative for dysuria.   Musculoskeletal:  Negative for back pain, joint pain, myalgias and neck pain.   Skin:  Negative for rash.   Neurological:  Positive for headaches (hx of migraines). Negative for dizziness, tingling, sensory change, speech change and focal weakness.   Psychiatric/Behavioral:  Negative for substance abuse.        PHYSICAL EXAMINATION:  BP 94/60   Pulse 70   Temp 36.4 °C (97.6 °F) (Tympanic)   Resp 14   Ht 1.676 m (5' 6\")   Wt 61.2 kg (135 lb)   SpO2 92%   BMI 21.79 kg/m²   Physical Exam  Vitals and nursing note reviewed. Exam conducted with a chaperone present (family at bedside).   Constitutional:       General: She is awake. She is not in acute distress.     Appearance: She is well-developed. She is not ill-appearing.   HENT:      Head: Normocephalic.      Right Ear: External ear normal.      Left Ear: External ear normal.      Nose: Nose normal.      Mouth/Throat:      Mouth: Mucous membranes are moist.      Pharynx: Oropharynx is clear.      Comments: Right upper lip laceration approximated with suture, edematous  Eyes:      Extraocular Movements: Extraocular movements intact.      Pupils: Pupils are equal, round, and reactive to light.   Cardiovascular:      Rate and Rhythm: Normal rate and regular rhythm.     "  Pulses: Normal pulses.      Heart sounds: Normal heart sounds. No murmur heard.  Pulmonary:      Effort: Pulmonary effort is normal. No respiratory distress.   Chest:      Chest wall: No tenderness.   Abdominal:      General: There is no distension.      Palpations: Abdomen is soft.      Tenderness: There is no abdominal tenderness. There is no guarding.   Musculoskeletal:         General: No swelling, tenderness, deformity or signs of injury.      Cervical back: Normal range of motion and neck supple.      Comments: Moves all extremities   Skin:     General: Skin is warm and dry.      Capillary Refill: Capillary refill takes less than 2 seconds.   Neurological:      Mental Status: She is alert.      GCS: GCS eye subscore is 4. GCS verbal subscore is 5. GCS motor subscore is 6.   Psychiatric:         Mood and Affect: Mood normal.         Behavior: Behavior normal. Behavior is cooperative.         LABORATORY VALUES:  Recent Labs     04/13/24 2142   WBC 10.1   RBC 4.57   HEMOGLOBIN 13.9   HEMATOCRIT 41.0   MCV 89.7   MCH 30.4   MCHC 33.9   RDW 42.6   PLATELETCT 262   MPV 10.2     Recent Labs     04/13/24 2142   SODIUM 139   POTASSIUM 3.9   CHLORIDE 108   CO2 21   GLUCOSE 96   BUN 12   CREATININE 0.49*   CALCIUM 9.2     Recent Labs     04/13/24 2142 04/13/24  2317   ASTSGOT 35  --    ALTSGPT 15  --    TBILIRUBIN 0.3  --    ALKPHOSPHAT 78  --    GLOBULIN 2.6  --    INR 0.93 1.02     Recent Labs     04/13/24 2142 04/13/24 2317   APTT 28.8 31.1   INR 0.93 1.02       IMAGING:  EC-ECHOCARDIOGRAM COMPLETE W/O CONT   Final Result      CL-LEFT HEART CATHETERIZATION WITH POSSIBLE INTERVENTION    (Results Pending)       RAP Score Total: 3      CAGE Results: not completed Blood Alcohol>0.08: no   Denies alcohol, tobacco, marijuana or illicit drug use.    All current laboratory studies/radiology exams reviewed: Yes    Medications reconciliation has been reviewed: Yes, management per medicine    Completed Consultations:    [place], [facility] medicine  Dr. Galan, plastics    Pending Consultations:  None    Newly identified diagnoses, injuries and/or co-morbidities:  None    - Facial laceration repaired in ED by Dr. Galan follow up in 10 days.  - Recommend antibiotics and tetanus given dog bite to mouth. Will defer to primary team.  - NSTEMI management per primary team.    Trauma tertiary and SBIRT per trauma guidelines and quality measures. This note does not constitute as a formal trauma consult. Please defer all management to primary team.     Discussed patient condition with Family, RN, Patient, and hospitalist. Dr. Mata

## 2024-04-14 NOTE — ED NOTES
Consent for lip closure signed by patient.  Lip closure procedure done at bedside for Dr. Galan with plastics

## 2024-04-14 NOTE — ED NOTES
Trauma green tx from [facility] for a dog bit that occurred at home, pt states it was her own dog that bit her R upper lip     Pt states she had sudden onset of chest pain after the dog bite occurred, pt was noted to have elevated Troponin PTA at [facility], pt denies any cardiac hx    PTA:  0.5mg ativan  15mg toradol  4mg zofran  4mg morphine    Pt states she received her tetanus PTA

## 2024-04-14 NOTE — ED PROVIDER NOTES
ED Provider Note    CHIEF COMPLAINT  Chief Complaint   Patient presents with    Trauma Green     Trauma green tx from [facility] for a dog bit that occurred at home, pt states it was her own dog that bit her R upper lip     Pt states she had sudden onset of chest pain after the dog bite occurred, pt was noted to have elevated Troponin PTA at [facility], pt denies any cardiac hx    PTA:  0.5mg ativan  15mg toradol  4mg zofran  4mg morphine    Pt states she received her tetanus PTA       EXTERNAL RECORDS REVIEWED  External ED Note patient transferred from [facility] for a dog bite    HPI/ROS  LIMITATION TO HISTORY   Select: : None  OUTSIDE HISTORIAN(S):  EMS reports patient received 0.5 mg Ativan, 50 mg Toradol, 2 mg Zofran, 2 mg morphine prior to arrival    Chloé Cummings is a 68 y.o. female who presents with a dog bite to the face and upper lip.  Patient reports that it was a family ember's dog but unknown vaccination status.  Patient reports that she had sudden onset chest pain after the dog bite occurred.  Patient reportedly had an elevated troponin at [facility] and was transferred to this facility.  Patient was reportedly consulted to cardiology at [facility] who did not think the patient was having a STEMI. Heparin was held at outside hospital.     PAST MEDICAL HISTORY   has a past medical history of Bipolar 2 disorder (HCC), Herpes genitalis in women, Insomnia, and Migraines.    SURGICAL HISTORY   has a past surgical history that includes craniotomy (2011); arthroscopy, knee; and shoulder arthroscopy.    FAMILY HISTORY  Family History   Problem Relation Age of Onset    Stroke Mother     Heart Attack Mother     Cancer Father         Lung    Stroke Sister     Heart Attack Maternal Grandmother        SOCIAL HISTORY  Social History     Tobacco Use    Smoking status: Never    Smokeless tobacco: Never   Substance and Sexual Activity    Alcohol use: No    Drug use: No    Sexual activity: Not  "Currently       CURRENT MEDICATIONS  Home Medications    **Home medications have not yet been reviewed for this encounter**         ALLERGIES  Allergies   Allergen Reactions    Nkda [No Known Drug Allergy]        PHYSICAL EXAM  VITAL SIGNS: /60   Pulse 83   Resp (!) 10   Ht 1.676 m (5' 6\")   Wt 61.2 kg (135 lb)   SpO2 95%   BMI 21.79 kg/m²    Vitals and nursing note reviewed.   Constitutional:       Comments: Patient is lying in bed supine, pleasant, conversant, speaking in complete sentences   HENT:  Large macerated skin avulsion of right upper lip encompassing entire lip and skin over the alveolar ridge, no tooth or maxillary tenderness   Eyes:      Extraocular Movements: Extraocular movements intact.      Conjunctiva/sclera: Conjunctivae normal.      Pupils: Pupils are equal, round, and reactive to light.   Cardiovascular:      Pulses: Normal pulses.      Comments: HR 83  Pulmonary:      Effort: Pulmonary effort is normal. No respiratory distress.   Musculoskeletal:         General: No swelling. Normal range of motion.      Cervical back: Normal range of motion. No rigidity.   Skin:     General: Skin is warm and dry.      Capillary Refill: Capillary refill takes less than 2 seconds.   Neurological:      Mental Status: Alert.       EKG/LABS  ST depression laterally and inferiorly, V2 <1mm ST elevation  I have independently interpreted this EKG    RADIOLOGY/PROCEDURES   I have independently interpreted the diagnostic imaging associated with this visit and am waiting the final reading from the radiologist.     Radiologist interpretation:  No orders to display       COURSE & MEDICAL DECISION MAKING    ASSESSMENT, COURSE AND PLAN  Care Narrative: Dr. Durbin has been consulted regarding patient's NSTEMI and concerning ST elevations on EKG.  He does not think patient is suffering from an acute STEMI.  He would like to cath the patient in the morning and does recommend IV heparin.  I spoken to plastic surgery as " well and they recommend pursuing cardiology recommendations and to give anticoagulation should cardiology requested for the patient's NSTEMI.  Patient with elevated troponin, will be admitted to the hospital medicine service.    This dictation has been created using voice recognition software. I am continuously working with the software to minimize the number of voice recognition errors and I have made every attempt to manually correct the errors within my dictation. However errors  related to this voice recognition software may still exist and should be interpreted within the appropriate context.     Electronically signed by: Paevl Perry M.D., 4/13/2024 11:17 PM    Heart score 8    CRITICAL CARE  The very real possibilty of a deterioration of this patient's condition required the highest level of my preparedness for sudden, emergent intervention.  I provided critical care services, which included medication orders, frequent reevaluations of the patient's condition and response to treatment, ordering and reviewing test results, and discussing the case with various consultants.  The critical care time associated with the care of the patient was 35 minutes. Review chart for interventions. This time is exclusive of any other billable procedures.       DISPOSITION AND DISCUSSIONS  I have discussed management of the patient with the following physicians and ROSALBA's: Dr. Durbin, Dr. Galan      FINAL DIAGNOSIS  1. NSTEMI (non-ST elevated myocardial infarction) (HCC)    2. Dog bite, initial encounter    3. Avulsion of skin of face, initial encounter           Electronically signed by: Pavel Perry M.D., 4/13/2024 10:35 PM

## 2024-04-14 NOTE — ASSESSMENT & PLAN NOTE
Sustained a dog bite to the right upper lip, a part of her lip was torn off  Topical lidocaine as needed (reports great symptomatic relief from this)  Given tetanus shot at outside facility  Plastic surgery consulted by ERP, no plans for intervention until after ACS workup

## 2024-04-14 NOTE — ED NOTES
Pt resting on hospital bed with spouse, connected to bedside monitor, call light in reach. NAD noted at this time. Awaiting inpt bed

## 2024-04-14 NOTE — ASSESSMENT & PLAN NOTE
Noted to have an episode of chest pain shortly after she was bitten by dog.  Initial troponin 928 and EKG showing mild ST depression in lateral leads.  Currently symptom-free.  Suspect stress cardiomyopathy rather than ACS.  Patient has had a lot of stressors in her life, including work and death of family members recently.  No previous cardiac history.  ACS should be ruled out  Cardiology on board, cardiac catheterization in a.m.  Loaded with aspirin and start heparin drip

## 2024-04-14 NOTE — PROGRESS NOTES
Cardiology Progress Note    Called by Dr. Perry from ER regarding abnormal EKG. Pt is a 69 yo F. No cardiac history. Normal perfusion study in 2017. Was attacked in the face by a dog. Immediately afterwards with chest discomfort. Reportedly better now and CP free. This was around 6-7 hours ago. Trop abnormal ~900s. EKG is rather unusual for acute ischemia specific territory wise and shows sinus rhythm, ST fullness isolated in V2 (<1mm elevation compared with true iso-electric level) with poor R wave progression, subtle diffuse ST depression elsewhere in limb and precordial leads. I do not think meets criteria for anterior STEMI, and clinically now CP free and timing of symptoms relative to current trop also does not correspond necessarily to acute anterior transmural infarct. Stress cardiomyopathy in setting of animal bite to the face a possibility but is a diagnosis of exclusion after defining coronary anatomy. Agree with medical therapy for NSTEMI including parenteral anticoagulation and evaluation for coronary angiography in the AM.    Prince Durbin MD

## 2024-04-14 NOTE — ED NOTES
Med rec updated and complete. Allergies reviewed. Pt denies anticoagulant and antiplatelet medications.  No outpatient antibiotic use  in last 30 days.  Pt has been inconsistent in taking the topiramate.     [facility] [facility]  [facility] = [phone]

## 2024-04-14 NOTE — ED NOTES
Bedside report received from off going RN Allyson, assumed care of patient.  POC discussed with patient. Call light within reach, all needs addressed at this time.       Fall risk interventions in place: Not Applicable (all applicable per [place] Fall risk assessment)   Continuous monitoring: Cardiac Leads, Pulse Ox, or Blood Pressure  IVF/IV medications: Infusion per MAR (List Med(s)) Heparin see mar  Oxygen: Room Air  Bedside sitter: Not Applicable   Isolation: Not Applicable

## 2024-04-14 NOTE — PROCEDURES
Cardiac Catheterization Laboratory Procedure Note    DATE: 4/14/2024    : Gerardo Gregory MD    PROCEDURES PERFORMED:  Left heart catheterization  Coronary angiography  Moderate conscious sedation    INDICATIONS:  The patient is a 68-year-old woman referred for cardiac catheterization to evaluate an acute episode of chest pain with an elevated troponin that occurred after a dog bite to the face.    CONSENT:  The complete alternatives, risks, and benefits of the procedure were explained to the patient.  Signed informed consent was obtained and placed in the chart prior to the procedure.  A timeout was performed prior to beginning the procedure.    MEDICATIONS:  Lidocaine  Fentanyl  Midazolam  Nitroglycerin  Verapamil  Heparin    MODERATE CONSCIOUS SEDATION:  I personally supervised the administration of moderate conscious sedation by the nursing staff for 10 minutes.  Sedation start time: 3:26 PM  Sedation end time: 3:36 PM    CONTRAST: Omnipaque 20 cc    ACCESS: 6-Telugu Glidesheath in the right radial artery.    ESTIMATED BLOOD LOSS: 10 cc    COMPLICATIONS: None    DESCRIPTION OF PROCEDURE:  The patient was brought to the cardiac catheterization laboratory in the fasting state.  The skin over the right wrist was prepped and draped in the usual sterile fashion.  Lidocaine infiltration was used to anesthetize the tissue over the right radial artery.  Using the micropuncture technique, a 6-Telugu Glidesheath was inserted in the right radial artery.  A 5-Telugu Timmy catheter was then advanced over a standard J-wire into the left ventricular cavity where it was gently aspirated, flushed, and then withdrawn across the aortic valve with sequential pressures measured.  This catheter was then used to engage the ostium of the left main coronary artery and cineangiograms were obtained in multiple projections for complete evaluation of the left coronary system.  This catheter was then used to engage the ostium of the  right coronary artery and cineangiograms were obtained in multiple projections for complete evaluation of the right coronary system.  At the completion of the case all wires, catheters, and sheaths were removed.  A TR band was placed using the patent hemostasis technique.    HEMODYNAMICS:   Aortic pressure: 99/67 mmHg  Pre-A wave pressure: 12 mmHg  No significant aortic gradient on pullback    CORONARY ANGIOGRAPHY:  The left main coronary artery is patent and bifurcates into the left anterior descending and left circumflex coronary arteries.  The left anterior descending coronary artery the left anterior descending coronary is a large, transapical vessel with a mild mid vessel intramyocardial bridge associated with very mild coronary plaque.  The LAD supplies a large first diagonal branch with ostial 30% disease.  The left circumflex coronary artery is a large, nondominant vessel that supplies a moderate first obtuse marginal branch, a small second obtuse marginal branch, and a small posterolateral branch and has no angiographically significant disease.  The right coronary artery is a large, dominant vessel with very mild proximal plaque.  Distally, it bifurcates into a moderate posterior descending coronary and a small posterolateral branch with no angiographically significant disease.    IMPRESSION:  Very mild, nonobstructive coronary artery disease.  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  Normal left heart filling pressures.    RECOMMENDATIONS:  Return to floor with continued care per primary service.  TR band release per protocol.  Optimal medical management of suspected stress-mediated cardiomyopathy.    NOTIFICATION:  The patient's family was notified of the results of her cardiac catheterization.

## 2024-04-15 VITALS
BODY MASS INDEX: 22.64 KG/M2 | SYSTOLIC BLOOD PRESSURE: 117 MMHG | TEMPERATURE: 97.6 F | DIASTOLIC BLOOD PRESSURE: 66 MMHG | HEART RATE: 78 BPM | OXYGEN SATURATION: 95 % | WEIGHT: 140.87 LBS | HEIGHT: 66 IN | RESPIRATION RATE: 18 BRPM

## 2024-04-15 LAB
ALBUMIN SERPL BCP-MCNC: 3.5 G/DL (ref 3.2–4.9)
BUN SERPL-MCNC: 12 MG/DL (ref 8–22)
CALCIUM ALBUM COR SERPL-MCNC: 9 MG/DL (ref 8.5–10.5)
CALCIUM SERPL-MCNC: 8.6 MG/DL (ref 8.5–10.5)
CHLORIDE SERPL-SCNC: 104 MMOL/L (ref 96–112)
CO2 SERPL-SCNC: 23 MMOL/L (ref 20–33)
CREAT SERPL-MCNC: 0.5 MG/DL (ref 0.5–1.4)
ERYTHROCYTE [DISTWIDTH] IN BLOOD BY AUTOMATED COUNT: 44.1 FL (ref 35.9–50)
GFR SERPLBLD CREATININE-BSD FMLA CKD-EPI: 101 ML/MIN/1.73 M 2
GLUCOSE SERPL-MCNC: 94 MG/DL (ref 65–99)
HCT VFR BLD AUTO: 39.3 % (ref 37–47)
HGB BLD-MCNC: 12.7 G/DL (ref 12–16)
MCH RBC QN AUTO: 30.1 PG (ref 27–33)
MCHC RBC AUTO-ENTMCNC: 32.3 G/DL (ref 32.2–35.5)
MCV RBC AUTO: 93.1 FL (ref 81.4–97.8)
PHOSPHATE SERPL-MCNC: 3.1 MG/DL (ref 2.5–4.5)
PLATELET # BLD AUTO: 217 K/UL (ref 164–446)
PMV BLD AUTO: 10.1 FL (ref 9–12.9)
POTASSIUM SERPL-SCNC: 3.9 MMOL/L (ref 3.6–5.5)
RBC # BLD AUTO: 4.22 M/UL (ref 4.2–5.4)
SODIUM SERPL-SCNC: 137 MMOL/L (ref 135–145)
WBC # BLD AUTO: 6.9 K/UL (ref 4.8–10.8)

## 2024-04-15 PROCEDURE — A9270 NON-COVERED ITEM OR SERVICE: HCPCS | Performed by: HOSPITALIST

## 2024-04-15 PROCEDURE — 99239 HOSP IP/OBS DSCHRG MGMT >30: CPT | Performed by: STUDENT IN AN ORGANIZED HEALTH CARE EDUCATION/TRAINING PROGRAM

## 2024-04-15 PROCEDURE — 80069 RENAL FUNCTION PANEL: CPT

## 2024-04-15 PROCEDURE — 700111 HCHG RX REV CODE 636 W/ 250 OVERRIDE (IP): Mod: JZ | Performed by: HOSPITALIST

## 2024-04-15 PROCEDURE — 85027 COMPLETE CBC AUTOMATED: CPT

## 2024-04-15 PROCEDURE — 700105 HCHG RX REV CODE 258: Performed by: HOSPITALIST

## 2024-04-15 PROCEDURE — 700102 HCHG RX REV CODE 250 W/ 637 OVERRIDE(OP)

## 2024-04-15 PROCEDURE — 700101 HCHG RX REV CODE 250: Performed by: STUDENT IN AN ORGANIZED HEALTH CARE EDUCATION/TRAINING PROGRAM

## 2024-04-15 PROCEDURE — A9270 NON-COVERED ITEM OR SERVICE: HCPCS

## 2024-04-15 PROCEDURE — 700102 HCHG RX REV CODE 250 W/ 637 OVERRIDE(OP): Performed by: HOSPITALIST

## 2024-04-15 RX ORDER — AMOXICILLIN AND CLAVULANATE POTASSIUM 875; 125 MG/1; MG/1
1 TABLET, FILM COATED ORAL 2 TIMES DAILY
Qty: 14 TABLET | Refills: 0 | Status: ACTIVE | OUTPATIENT
Start: 2024-04-15 | End: 2024-04-22

## 2024-04-15 RX ORDER — METOPROLOL SUCCINATE 25 MG/1
25 TABLET, EXTENDED RELEASE ORAL DAILY
Qty: 30 TABLET | Refills: 0 | Status: SHIPPED | OUTPATIENT
Start: 2024-04-15 | End: 2024-04-15

## 2024-04-15 RX ORDER — LIDOCAINE 50 MG/G
1 OINTMENT TOPICAL 3 TIMES DAILY PRN
Qty: 35 G | Refills: 1 | Status: SHIPPED | OUTPATIENT
Start: 2024-04-15 | End: 2024-04-15

## 2024-04-15 RX ORDER — ATORVASTATIN CALCIUM 40 MG/1
40 TABLET, FILM COATED ORAL EVERY EVENING
Qty: 30 TABLET | Refills: 0 | Status: SHIPPED | OUTPATIENT
Start: 2024-04-15

## 2024-04-15 RX ORDER — METOPROLOL SUCCINATE 25 MG/1
25 TABLET, EXTENDED RELEASE ORAL DAILY
Qty: 30 TABLET | Refills: 0 | Status: SHIPPED | OUTPATIENT
Start: 2024-04-15

## 2024-04-15 RX ORDER — AMOXICILLIN AND CLAVULANATE POTASSIUM 875; 125 MG/1; MG/1
1 TABLET, FILM COATED ORAL 2 TIMES DAILY
Qty: 14 TABLET | Refills: 0 | Status: ACTIVE | OUTPATIENT
Start: 2024-04-15 | End: 2024-04-15

## 2024-04-15 RX ORDER — LIDOCAINE 50 MG/G
1 OINTMENT TOPICAL 3 TIMES DAILY PRN
Qty: 35 G | Refills: 1 | Status: SHIPPED | OUTPATIENT
Start: 2024-04-15 | End: 2024-04-29

## 2024-04-15 RX ORDER — ATORVASTATIN CALCIUM 40 MG/1
40 TABLET, FILM COATED ORAL EVERY EVENING
Qty: 30 TABLET | Refills: 0 | Status: SHIPPED | OUTPATIENT
Start: 2024-04-15 | End: 2024-04-15

## 2024-04-15 RX ORDER — LIDOCAINE 50 MG/G
OINTMENT TOPICAL 3 TIMES DAILY PRN
Status: DISCONTINUED | OUTPATIENT
Start: 2024-04-15 | End: 2024-04-15 | Stop reason: HOSPADM

## 2024-04-15 RX ADMIN — LIDOCAINE HYDROCHLORIDE 15 ML: 20 SOLUTION ORAL at 06:35

## 2024-04-15 RX ADMIN — ACETAMINOPHEN, ASPIRIN, CAFFEINE 1 TABLET: 250; 65; 250 TABLET, FILM COATED ORAL at 08:00

## 2024-04-15 RX ADMIN — AMPICILLIN AND SULBACTAM 3 G: 1; 2 INJECTION, POWDER, FOR SOLUTION INTRAMUSCULAR; INTRAVENOUS at 04:00

## 2024-04-15 RX ADMIN — TRAMADOL HYDROCHLORIDE 50 MG: 50 TABLET ORAL at 04:26

## 2024-04-15 ASSESSMENT — PAIN DESCRIPTION - PAIN TYPE: TYPE: ACUTE PAIN

## 2024-04-15 ASSESSMENT — FIBROSIS 4 INDEX: FIB4 SCORE: 2.87

## 2024-04-15 NOTE — CARE PLAN
The patient is Stable - Low risk of patient condition declining or worsening    Shift Goals  Clinical Goals: Cath lab  Patient Goals: Pain control  Family Goals: Cath lab, remain updated on plan of care    Progress made toward(s) clinical / shift goals: Patient's heart cath complete; clean cath, no intervention needed. Migraine pain relief with tramadol. SO and patient updated on plan of care; plan to monitor overnight and discharge anticipated in the morning.     Problem: Pain - Standard  Goal: Alleviation of pain or a reduction in pain to the patient’s comfort goal  Outcome: Progressing     Problem: Knowledge Deficit - Standard  Goal: Patient and family/care givers will demonstrate understanding of plan of care, disease process/condition, diagnostic tests and medications  Outcome: Progressing       Patient is not progressing towards the following goals:

## 2024-04-15 NOTE — CARE PLAN
The patient is Stable - Low risk of patient condition declining or worsening    Shift Goals  Clinical Goals: migraine relief  Patient Goals: migraine relief  Family Goals: talya    Progress made toward(s) clinical / shift goals:    Problem: Pain - Standard  Goal: Alleviation of pain or a reduction in pain to the patient’s comfort goal  Outcome: Progressing     Problem: Knowledge Deficit - Standard  Goal: Patient and family/care givers will demonstrate understanding of plan of care, disease process/condition, diagnostic tests and medications  Outcome: Progressing       Patient is not progressing towards the following goals:

## 2024-04-15 NOTE — DISCHARGE SUMMARY
Discharge Summary    CHIEF COMPLAINT ON ADMISSION  Chief Complaint   Patient presents with    Trauma Green     Trauma green tx from [facility] for a dog bit that occurred at home, pt states it was her own dog that bit her R upper lip     Pt states she had sudden onset of chest pain after the dog bite occurred, pt was noted to have elevated Troponin PTA at [facility], pt denies any cardiac hx    PTA:  0.5mg ativan  15mg toradol  4mg zofran  4mg morphine    Pt states she received her tetanus PTA       Reason for Admission  68 f, dog bite to face     Admission Date  4/13/2024    CODE STATUS  Full Code    HPI & HOSPITAL COURSE  This is 68-year-old female with a past medical history significant for migraine presented to the ER on 4/13/2022   for dog bit her in the upper lip.  She noted excruciating pain after and had a part of her right upper lip torn off.  She went to outside facility ER for evaluation.  As she was on the way, she began to have a severe headache, dizziness, sharp chest pain.  Code STEMI was activated at outside facility, cardiology team canceled code STEMI and patient was then transferred to [facility].     Upon presentation in ER, patient troponin is elevated at 928, 955.  Patient was started with heparin drip.  Cardiology was consulted.  Patient underwent LHC on 4/14 showed very mild, nonobstructive coronary artery disease. 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 Normal left heart filling pressures. Her echo notes EF 45%, Asymmetric septal hypertrophy without obstruction. Akinesis of the mid anterior, mid anterolateral, apical septum, and mid anteroseptum segments. She is started with metoprolol for secondary prevention of stress cardiomyopathy, which is now her presumed diagnosis. She is started with statin for non-obstructive disease.     For her lip avulsion, plastic surgery Dr. Galan is consulted. Patient underwent repair. She is on board spectrum antibiotics, which was transitioned  to oral antibiotics. She is advised to follow up with [facility] in 10 days and continue antibiotics and sun avoidance. I have prescribed with Lidocaine ointment for pain control. Patient had tetanus shot at outside facility ER prior arrival at our facility.     Therefore, she is discharged in good and stable condition to home with close outpatient follow-up.    The patient recovered much more quickly than anticipated on admission.    Discharge Date  4/15/24    FOLLOW UP ITEMS POST DISCHARGE  PCP  Cardiology   [facility] in 10 days    DISCHARGE DIAGNOSES  Principal Problem:    NSTEMI (non-ST elevated myocardial infarction) ([facility]) (POA: Yes)  Active Problems:    Dog bite (POA: Unknown)  Resolved Problems:    * No resolved hospital problems. *      FOLLOW UP  No future appointments.  Tino Vanessa M.D.  [address]  [place] [id number] [phone]    Follow up in 1 week(s)      Bj Galan M.D.  [address]  [address]  [place] [id number] [phone]    Follow up in 10 day(s)        MEDICATIONS ON DISCHARGE     Medication List        START taking these medications        Instructions   amoxicillin-clavulanate 875-125 MG Tabs  Commonly known as: Augmentin   Take 1 Tablet by mouth 2 times a day for 7 days.  Dose: 1 Tablet     atorvastatin 40 MG Tabs  Commonly known as: Lipitor   Take 1 Tablet by mouth every evening.  Dose: 40 mg     lidocaine 5 % Oint  Commonly known as: Xylocaine   Doctor's comments: Apply to lip area  Apply 1 Each topically 3 times a day as needed (lip pain) for up to 14 days.  Dose: 1 Each     metoprolol SR 25 MG Tb24  Commonly known as: Toprol XL   Take 1 Tablet by mouth every day.  Dose: 25 mg            CONTINUE taking these medications        Instructions   amphetamine-dextroamphetamine 10 MG Tabs  Commonly known as: Adderall   Take 5 mg by mouth see administration instructions. 10 mg = AM   10 mg = afternoon if needed  Dose: 5 mg     topiramate 25 MG Tabs  Commonly  known as: Topamax   Take 25 mg by mouth every day.  Dose: 25 mg              Allergies  Allergies   Allergen Reactions    Nkda [No Known Drug Allergy]        DIET  Orders Placed This Encounter   Procedures    Diet Order Diet: Cardiac     Standing Status:   Standing     Number of Occurrences:   1     Order Specific Question:   Diet:     Answer:   Cardiac [6]       ACTIVITY  As tolerated.  Weight bearing as tolerated    CONSULTATIONS  Plastic surgery  cardiology    PROCEDURES  S/p lip repair  S/p LHC    LABORATORY  Lab Results   Component Value Date    SODIUM 137 04/15/2024    POTASSIUM 3.9 04/15/2024    CHLORIDE 104 04/15/2024    CO2 23 04/15/2024    GLUCOSE 94 04/15/2024    BUN 12 04/15/2024    CREATININE 0.50 04/15/2024        Lab Results   Component Value Date    WBC 6.9 04/15/2024    HEMOGLOBIN 12.7 04/15/2024    HEMATOCRIT 39.3 04/15/2024    PLATELETCT 217 04/15/2024      EC-ECHOCARDIOGRAM COMPLETE W/O CONT   Final Result      CL-LEFT HEART CATHETERIZATION WITH POSSIBLE INTERVENTION    (Results Pending)       Total time of the discharge process 32 minutes.

## 2024-04-15 NOTE — PROGRESS NOTES
Bedside report received from off going RN/tech: Jeniffer assumed care of patient.     Fall Risk Score: NO RISK  Fall risk interventions in place: Provide patient/family education based on risk assessment  Bed type: Regular (Gautam Score less than 17 interventions in place)  Patient on cardiac monitor: Yes  IVF/IV medications: Infusion per MAR (List Med(s)) NS 83  Oxygen: Room Air  Bedside sitter: Not Applicable   Isolation: Not applicable

## 2024-04-15 NOTE — DISCHARGE INSTRUCTIONS
Discharge Instructions    Discharged to home by car with relative. Discharged via wheelchair, hospital escort: Yes.  Special equipment needed: Not Applicable    Be sure to schedule a follow-up appointment with your primary care doctor or any specialists as instructed.     Discharge Plan:        I understand that a diet low in cholesterol, fat, and sodium is recommended for good health. Unless I have been given specific instructions below for another diet, I accept this instruction as my diet prescription.       Discharge Instructions per Zoë Alcantar M.D.    Please follow-up with PCP as outpatient in one week  Please take Toproxl daily as prescribed. Check your blood pressure. If you feel dizzy and your systolic blood pressure less than 100, please hold metoprolol. Follow up with your cardiology as outpatient  Take atorvastatin daily  Take antibiotics twice a day. Side effects reviewed   Follow up with plastic surgery in 10 days  Sun avoidance, topical ointment and soft foods recommended  Avoid taking NSAID such as ibuprofen, Aleve or motion. Take tylenol for mild pain and take lidocaine ointment for lip pain    Return to ER in the event of new or worsening symptoms. Please note importance of compliance and the patient has agreed to proceed with all medical recommendations and follow up plan indicated above. All medications come with benefits and risks. Risks may include permanent injury or death and these risks can be minimized with close reassessment and monitoring. Please make it to your scheduled follow ups with PCP, cardiology and plastic surgery

## 2024-06-05 ENCOUNTER — PRE-ADMISSION TESTING (OUTPATIENT)
Dept: ADMISSIONS | Facility: MEDICAL CENTER | Age: 69
End: 2024-06-05
Attending: SPECIALIST
Payer: MEDICARE

## 2024-06-05 RX ORDER — MULTIVIT-MIN/IRON/FOLIC ACID/K 18-600-40
CAPSULE ORAL DAILY
COMMUNITY

## 2024-06-05 RX ORDER — ACETAMINOPHEN 325 MG/1
650 TABLET ORAL EVERY 6 HOURS PRN
COMMUNITY

## 2024-06-05 NOTE — PREPROCEDURE INSTRUCTIONS
PreAdmit Telephone Appointment: Reviewed the Preparing for your procedure handout with patient over the phone. Patient instructed per pharmacy guidelines regarding taking, holding or contacting provider for instructions on regularly prescribed medications before surgery. Instructed to take the following medications the day of surgery with a sip of water per pharmacy medication guidelines: Tylenol if needed.  Pt reports she was told by Dr. Hunter to hold her Adderall  Pt denies issues with anesthesia  Pt reports she had stress cardiomyopathy even when she had the dog bite, [facility] recent notes (4/18/24) attached to chart.    Called Dr. Hunter's office and spoke to Mary Ellen,  she checked with Dr. Hunter and he is aware of the stress cardiomyopathy event and her echo results, faxed echo and EKG to Dr. Hunter's office for their records  Advised pt to notify her cardiologist of upcoming surgery 6/7/2024 and she said she will      Confirmed with patient where to check in morning of surgery. Handouts/Brochure/Video emailed to patient.

## 2024-06-06 NOTE — OR NURSING
Pt states she did reach out to her cardiologist for clearance for surgery 6/7/24 and she received a note in her [facility]'s mychart. Pt will bring her phone to show the anesthesiologist tomorrow.

## 2024-06-07 ENCOUNTER — ANESTHESIA (OUTPATIENT)
Dept: SURGERY | Facility: MEDICAL CENTER | Age: 69
End: 2024-06-07
Payer: MEDICARE

## 2024-06-07 ENCOUNTER — HOSPITAL ENCOUNTER (OUTPATIENT)
Facility: MEDICAL CENTER | Age: 69
End: 2024-06-07
Attending: SPECIALIST | Admitting: SPECIALIST
Payer: MEDICARE

## 2024-06-07 ENCOUNTER — ANESTHESIA EVENT (OUTPATIENT)
Dept: SURGERY | Facility: MEDICAL CENTER | Age: 69
End: 2024-06-07
Payer: MEDICARE

## 2024-06-07 VITALS
SYSTOLIC BLOOD PRESSURE: 127 MMHG | RESPIRATION RATE: 18 BRPM | HEIGHT: 66 IN | TEMPERATURE: 98.2 F | OXYGEN SATURATION: 94 % | BODY MASS INDEX: 21.97 KG/M2 | WEIGHT: 136.69 LBS | DIASTOLIC BLOOD PRESSURE: 64 MMHG | HEART RATE: 54 BPM

## 2024-06-07 PROBLEM — I51.81 STRESS-INDUCED CARDIOMYOPATHY: Status: ACTIVE | Noted: 2024-04-13

## 2024-06-07 PROCEDURE — 700101 HCHG RX REV CODE 250: Performed by: ANESTHESIOLOGY

## 2024-06-07 PROCEDURE — 700105 HCHG RX REV CODE 258: Performed by: SPECIALIST

## 2024-06-07 PROCEDURE — 700111 HCHG RX REV CODE 636 W/ 250 OVERRIDE (IP): Mod: JZ | Performed by: ANESTHESIOLOGY

## 2024-06-07 PROCEDURE — 700101 HCHG RX REV CODE 250: Performed by: SPECIALIST

## 2024-06-07 PROCEDURE — 160025 RECOVERY II MINUTES (STATS): Performed by: SPECIALIST

## 2024-06-07 PROCEDURE — 160035 HCHG PACU - 1ST 60 MINS PHASE I: Performed by: SPECIALIST

## 2024-06-07 PROCEDURE — 160036 HCHG PACU - EA ADDL 30 MINS PHASE I: Performed by: SPECIALIST

## 2024-06-07 PROCEDURE — 160009 HCHG ANES TIME/MIN: Performed by: SPECIALIST

## 2024-06-07 PROCEDURE — 160048 HCHG OR STATISTICAL LEVEL 1-5: Performed by: SPECIALIST

## 2024-06-07 PROCEDURE — 160042 HCHG SURGERY MINUTES - EA ADDL 1 MIN LEVEL 5: Performed by: SPECIALIST

## 2024-06-07 PROCEDURE — 700111 HCHG RX REV CODE 636 W/ 250 OVERRIDE (IP): Performed by: ANESTHESIOLOGY

## 2024-06-07 PROCEDURE — 700102 HCHG RX REV CODE 250 W/ 637 OVERRIDE(OP): Performed by: ANESTHESIOLOGY

## 2024-06-07 PROCEDURE — 160046 HCHG PACU - 1ST 60 MINS PHASE II: Performed by: SPECIALIST

## 2024-06-07 PROCEDURE — 700111 HCHG RX REV CODE 636 W/ 250 OVERRIDE (IP): Performed by: SPECIALIST

## 2024-06-07 PROCEDURE — 160031 HCHG SURGERY MINUTES - 1ST 30 MINS LEVEL 5: Performed by: SPECIALIST

## 2024-06-07 PROCEDURE — A9270 NON-COVERED ITEM OR SERVICE: HCPCS | Performed by: ANESTHESIOLOGY

## 2024-06-07 PROCEDURE — 160002 HCHG RECOVERY MINUTES (STAT): Performed by: SPECIALIST

## 2024-06-07 RX ORDER — CEFAZOLIN SODIUM 1 G/3ML
INJECTION, POWDER, FOR SOLUTION INTRAMUSCULAR; INTRAVENOUS PRN
Status: DISCONTINUED | OUTPATIENT
Start: 2024-06-07 | End: 2024-06-07 | Stop reason: SURG

## 2024-06-07 RX ORDER — ONDANSETRON 2 MG/ML
4 INJECTION INTRAMUSCULAR; INTRAVENOUS
Status: COMPLETED | OUTPATIENT
Start: 2024-06-07 | End: 2024-06-07

## 2024-06-07 RX ORDER — HALOPERIDOL 5 MG/ML
1 INJECTION INTRAMUSCULAR
Status: DISCONTINUED | OUTPATIENT
Start: 2024-06-07 | End: 2024-06-07 | Stop reason: HOSPADM

## 2024-06-07 RX ORDER — DIPHENHYDRAMINE HYDROCHLORIDE 50 MG/ML
12.5 INJECTION INTRAMUSCULAR; INTRAVENOUS
Status: DISCONTINUED | OUTPATIENT
Start: 2024-06-07 | End: 2024-06-07 | Stop reason: HOSPADM

## 2024-06-07 RX ORDER — MIDAZOLAM HYDROCHLORIDE 1 MG/ML
1 INJECTION INTRAMUSCULAR; INTRAVENOUS
Status: DISCONTINUED | OUTPATIENT
Start: 2024-06-07 | End: 2024-06-07 | Stop reason: HOSPADM

## 2024-06-07 RX ORDER — SODIUM CHLORIDE, SODIUM LACTATE, POTASSIUM CHLORIDE, CALCIUM CHLORIDE 600; 310; 30; 20 MG/100ML; MG/100ML; MG/100ML; MG/100ML
INJECTION, SOLUTION INTRAVENOUS CONTINUOUS
Status: DISCONTINUED | OUTPATIENT
Start: 2024-06-07 | End: 2024-06-07 | Stop reason: HOSPADM

## 2024-06-07 RX ORDER — ROCURONIUM BROMIDE 10 MG/ML
INJECTION, SOLUTION INTRAVENOUS PRN
Status: DISCONTINUED | OUTPATIENT
Start: 2024-06-07 | End: 2024-06-07 | Stop reason: SURG

## 2024-06-07 RX ORDER — EPHEDRINE SULFATE 50 MG/ML
5 INJECTION, SOLUTION INTRAVENOUS
Status: DISCONTINUED | OUTPATIENT
Start: 2024-06-07 | End: 2024-06-07 | Stop reason: HOSPADM

## 2024-06-07 RX ORDER — IPRATROPIUM BROMIDE AND ALBUTEROL SULFATE 2.5; .5 MG/3ML; MG/3ML
3 SOLUTION RESPIRATORY (INHALATION)
Status: DISCONTINUED | OUTPATIENT
Start: 2024-06-07 | End: 2024-06-07 | Stop reason: HOSPADM

## 2024-06-07 RX ORDER — KETOROLAC TROMETHAMINE 30 MG/ML
15 INJECTION, SOLUTION INTRAMUSCULAR; INTRAVENOUS ONCE
Status: DISCONTINUED | OUTPATIENT
Start: 2024-06-07 | End: 2024-06-07

## 2024-06-07 RX ORDER — KETOROLAC TROMETHAMINE 15 MG/ML
15 INJECTION, SOLUTION INTRAMUSCULAR; INTRAVENOUS ONCE
Status: COMPLETED | OUTPATIENT
Start: 2024-06-07 | End: 2024-06-07

## 2024-06-07 RX ORDER — ONDANSETRON 2 MG/ML
INJECTION INTRAMUSCULAR; INTRAVENOUS PRN
Status: DISCONTINUED | OUTPATIENT
Start: 2024-06-07 | End: 2024-06-07 | Stop reason: SURG

## 2024-06-07 RX ORDER — HYDRALAZINE HYDROCHLORIDE 20 MG/ML
5 INJECTION INTRAMUSCULAR; INTRAVENOUS
Status: DISCONTINUED | OUTPATIENT
Start: 2024-06-07 | End: 2024-06-07 | Stop reason: HOSPADM

## 2024-06-07 RX ORDER — MEPERIDINE HYDROCHLORIDE 25 MG/ML
12.5 INJECTION INTRAMUSCULAR; INTRAVENOUS; SUBCUTANEOUS
Status: DISCONTINUED | OUTPATIENT
Start: 2024-06-07 | End: 2024-06-07 | Stop reason: HOSPADM

## 2024-06-07 RX ORDER — HYDROMORPHONE HYDROCHLORIDE 1 MG/ML
0.1 INJECTION, SOLUTION INTRAMUSCULAR; INTRAVENOUS; SUBCUTANEOUS
Status: DISCONTINUED | OUTPATIENT
Start: 2024-06-07 | End: 2024-06-07 | Stop reason: HOSPADM

## 2024-06-07 RX ORDER — GLYCOPYRROLATE 0.2 MG/ML
INJECTION INTRAMUSCULAR; INTRAVENOUS PRN
Status: DISCONTINUED | OUTPATIENT
Start: 2024-06-07 | End: 2024-06-07 | Stop reason: SURG

## 2024-06-07 RX ORDER — SODIUM CHLORIDE, SODIUM LACTATE, POTASSIUM CHLORIDE, CALCIUM CHLORIDE 600; 310; 30; 20 MG/100ML; MG/100ML; MG/100ML; MG/100ML
INJECTION, SOLUTION INTRAVENOUS CONTINUOUS
Status: DISCONTINUED | OUTPATIENT
Start: 2024-06-07 | End: 2024-06-07

## 2024-06-07 RX ORDER — HYDROMORPHONE HYDROCHLORIDE 1 MG/ML
0.4 INJECTION, SOLUTION INTRAMUSCULAR; INTRAVENOUS; SUBCUTANEOUS
Status: DISCONTINUED | OUTPATIENT
Start: 2024-06-07 | End: 2024-06-07 | Stop reason: HOSPADM

## 2024-06-07 RX ORDER — NEOSTIGMINE METHYLSULFATE 1 MG/ML
INJECTION, SOLUTION INTRAVENOUS PRN
Status: DISCONTINUED | OUTPATIENT
Start: 2024-06-07 | End: 2024-06-07 | Stop reason: SURG

## 2024-06-07 RX ORDER — ACETAMINOPHEN 500 MG
1000 TABLET ORAL ONCE
Status: COMPLETED | OUTPATIENT
Start: 2024-06-07 | End: 2024-06-07

## 2024-06-07 RX ORDER — EPHEDRINE SULFATE 50 MG/ML
INJECTION, SOLUTION INTRAVENOUS PRN
Status: DISCONTINUED | OUTPATIENT
Start: 2024-06-07 | End: 2024-06-07 | Stop reason: SURG

## 2024-06-07 RX ORDER — HYDROMORPHONE HYDROCHLORIDE 1 MG/ML
0.2 INJECTION, SOLUTION INTRAMUSCULAR; INTRAVENOUS; SUBCUTANEOUS
Status: DISCONTINUED | OUTPATIENT
Start: 2024-06-07 | End: 2024-06-07 | Stop reason: HOSPADM

## 2024-06-07 RX ORDER — DEXAMETHASONE SODIUM PHOSPHATE 4 MG/ML
INJECTION, SOLUTION INTRA-ARTICULAR; INTRALESIONAL; INTRAMUSCULAR; INTRAVENOUS; SOFT TISSUE PRN
Status: DISCONTINUED | OUTPATIENT
Start: 2024-06-07 | End: 2024-06-07 | Stop reason: SURG

## 2024-06-07 RX ORDER — OXYCODONE HCL 5 MG/5 ML
10 SOLUTION, ORAL ORAL
Status: COMPLETED | OUTPATIENT
Start: 2024-06-07 | End: 2024-06-07

## 2024-06-07 RX ORDER — LABETALOL HYDROCHLORIDE 5 MG/ML
5 INJECTION, SOLUTION INTRAVENOUS
Status: DISCONTINUED | OUTPATIENT
Start: 2024-06-07 | End: 2024-06-07 | Stop reason: HOSPADM

## 2024-06-07 RX ORDER — OXYCODONE HCL 5 MG/5 ML
5 SOLUTION, ORAL ORAL
Status: COMPLETED | OUTPATIENT
Start: 2024-06-07 | End: 2024-06-07

## 2024-06-07 RX ORDER — ROPIVACAINE HYDROCHLORIDE 5 MG/ML
INJECTION, SOLUTION EPIDURAL; INFILTRATION; PERINEURAL
Status: DISCONTINUED | OUTPATIENT
Start: 2024-06-07 | End: 2024-06-07 | Stop reason: HOSPADM

## 2024-06-07 RX ORDER — LIDOCAINE HYDROCHLORIDE 20 MG/ML
INJECTION, SOLUTION EPIDURAL; INFILTRATION; INTRACAUDAL; PERINEURAL PRN
Status: DISCONTINUED | OUTPATIENT
Start: 2024-06-07 | End: 2024-06-07 | Stop reason: SURG

## 2024-06-07 RX ADMIN — SODIUM CHLORIDE, POTASSIUM CHLORIDE, SODIUM LACTATE AND CALCIUM CHLORIDE: 600; 310; 30; 20 INJECTION, SOLUTION INTRAVENOUS at 09:39

## 2024-06-07 RX ADMIN — OXYCODONE HYDROCHLORIDE 10 MG: 5 SOLUTION ORAL at 13:05

## 2024-06-07 RX ADMIN — GLYCOPYRROLATE 0.4 MG: 0.2 INJECTION INTRAMUSCULAR; INTRAVENOUS at 12:25

## 2024-06-07 RX ADMIN — HYDROMORPHONE HYDROCHLORIDE 0.2 MG: 1 INJECTION, SOLUTION INTRAMUSCULAR; INTRAVENOUS; SUBCUTANEOUS at 13:24

## 2024-06-07 RX ADMIN — ROCURONIUM BROMIDE 40 MG: 50 INJECTION, SOLUTION INTRAVENOUS at 11:03

## 2024-06-07 RX ADMIN — NEOSTIGMINE METHYLSULFATE 3 MG: 1 INJECTION INTRAVENOUS at 12:25

## 2024-06-07 RX ADMIN — FENTANYL CITRATE 25 MCG: 50 INJECTION, SOLUTION INTRAMUSCULAR; INTRAVENOUS at 12:50

## 2024-06-07 RX ADMIN — ONDANSETRON 4 MG: 2 INJECTION INTRAMUSCULAR; INTRAVENOUS at 12:43

## 2024-06-07 RX ADMIN — KETOROLAC TROMETHAMINE 15 MG: 15 INJECTION, SOLUTION INTRAMUSCULAR; INTRAVENOUS at 14:54

## 2024-06-07 RX ADMIN — HYDROMORPHONE HYDROCHLORIDE 0.2 MG: 1 INJECTION, SOLUTION INTRAMUSCULAR; INTRAVENOUS; SUBCUTANEOUS at 13:34

## 2024-06-07 RX ADMIN — EPHEDRINE SULFATE 10 MG: 50 INJECTION, SOLUTION INTRAVENOUS at 12:15

## 2024-06-07 RX ADMIN — ONDANSETRON 4 MG: 2 INJECTION INTRAMUSCULAR; INTRAVENOUS at 12:25

## 2024-06-07 RX ADMIN — FENTANYL CITRATE 50 MCG: 50 INJECTION, SOLUTION INTRAMUSCULAR; INTRAVENOUS at 13:01

## 2024-06-07 RX ADMIN — FENTANYL CITRATE 25 MCG: 50 INJECTION, SOLUTION INTRAMUSCULAR; INTRAVENOUS at 12:45

## 2024-06-07 RX ADMIN — HYDROMORPHONE HYDROCHLORIDE 0.2 MG: 1 INJECTION, SOLUTION INTRAMUSCULAR; INTRAVENOUS; SUBCUTANEOUS at 13:49

## 2024-06-07 RX ADMIN — HYDROMORPHONE HYDROCHLORIDE 0.2 MG: 1 INJECTION, SOLUTION INTRAMUSCULAR; INTRAVENOUS; SUBCUTANEOUS at 13:15

## 2024-06-07 RX ADMIN — LIDOCAINE HYDROCHLORIDE 0.5 ML: 10 INJECTION, SOLUTION EPIDURAL; INFILTRATION; INTRACAUDAL at 09:39

## 2024-06-07 RX ADMIN — CEFAZOLIN 2 G: 1 INJECTION, POWDER, FOR SOLUTION INTRAMUSCULAR; INTRAVENOUS at 11:08

## 2024-06-07 RX ADMIN — DEXAMETHASONE SODIUM PHOSPHATE 8 MG: 4 INJECTION INTRA-ARTICULAR; INTRALESIONAL; INTRAMUSCULAR; INTRAVENOUS; SOFT TISSUE at 11:18

## 2024-06-07 RX ADMIN — ACETAMINOPHEN 1000 MG: 500 TABLET, FILM COATED ORAL at 14:54

## 2024-06-07 RX ADMIN — LIDOCAINE HYDROCHLORIDE 80 MG: 20 INJECTION, SOLUTION EPIDURAL; INFILTRATION; INTRACAUDAL at 11:03

## 2024-06-07 RX ADMIN — PROPOFOL 150 MG: 10 INJECTION, EMULSION INTRAVENOUS at 11:03

## 2024-06-07 ASSESSMENT — FIBROSIS 4 INDEX: FIB4 SCORE: 2.87

## 2024-06-07 ASSESSMENT — PAIN SCALES - GENERAL: PAIN_LEVEL: 0

## 2024-06-07 ASSESSMENT — PAIN DESCRIPTION - PAIN TYPE: TYPE: SURGICAL PAIN

## 2024-06-07 NOTE — ANESTHESIA PREPROCEDURE EVALUATION
Case: [id number] Date/Time: 06/07/24 1015    Procedures:       EXCISION OF RIGHT UPPER LIP SCAR CONTRACTURE WITH MUSCLE AND SKIN TRANSPOSITION FLAPS, UPPER LIP FRENULECTOMY AND MID LOWER SCAR EXCISION      FLAP GRAFT      RHYTIDECTOMY, FACE    Pre-op diagnosis: PAINFUL HYPERTROPHIC SCAR CONTRACTURE DEFORMITY RIGHT UPPER LIP AND MID LOWER LIP AND UPPER LIP FRENULA SCARRING    Location: [facility] OR 02 / SURGERY [facility]    Surgeons: Abilio Hunter M.D.            Relevant Problems   NEURO   (positive) Migraine headache      CARDIAC   (positive) Migraine headache   (positive) NSTEMI (non-ST elevated myocardial infarction) (HCC)      Other   (positive) Bipolar 2 disorder (HCC)   (positive) Insomnia   (positive) Post menopausal syndrome   (positive) Stress-induced cardiomyopathy       Physical Exam    Airway   Mallampati: II  TM distance: >3 FB  Neck ROM: full       Cardiovascular - normal exam  Rhythm: regular  Rate: normal  (-) murmur     Dental - normal exam           Pulmonary - normal exam  Breath sounds clear to auscultation     Abdominal    Neurological - normal exam                   Anesthesia Plan    ASA 3   ASA physical status 3 criteria: moderate reduction of ejection fraction    Plan - general       Airway plan will be ETT          Induction: intravenous    Postoperative Plan: Postoperative administration of opioids is intended.    Pertinent diagnostic labs and testing reviewed    Informed Consent:    Anesthetic plan and risks discussed with patient.    Use of blood products discussed with: patient whom consented to blood products.

## 2024-06-07 NOTE — DISCHARGE INSTRUCTIONS
ACTIVITY: Rest and take it easy for the first 24 hours.  A responsible adult is recommended to remain with you during that time.  It is normal to feel sleepy.  We encourage you to not do anything that requires balance, judgment or coordination.    MILD FLU-LIKE SYMPTOMS ARE NORMAL. YOU MAY EXPERIENCE GENERALIZED MUSCLE ACHES, THROAT IRRITATION, HEADACHE AND/OR SOME NAUSEA.    FOR 24 HOURS DO NOT:  Drive, operate machinery or run household appliances.  Drink beer or alcoholic beverages.   Make important decisions or sign legal documents.    SPECIAL INSTRUCTIONS:   -Apply Bacitracin to both lip incisions twice daily-Start tonight 6/7/24 at 6pm (Ok to remove dressings to apply)  -Keep dressing clean and dry and in place.  -Follow up appointment with  tomorrow 6/8/24 at 11:00 am.  -Ok to shower in 72 hours    DIET: To avoid nausea, slowly advance diet as tolerated, avoiding spicy or greasy foods for the first day.  Add more substantial food to your diet according to your physician's instructions.  Babies can be fed formula or breast milk as soon as they are hungry.  INCREASE FLUIDS AND FIBER TO AVOID CONSTIPATION.    SURGICAL DRESSING/BATHING:   -Ok to shower in 72 hours    FOLLOW-UP APPOINTMENT:  A follow-up appointment should be arranged with your doctor in, call to schedule.    You should CALL YOUR PHYSICIAN if you develop:  Fever greater than 101 degrees F.  Pain not relieved by medication, or persistent nausea or vomiting.  Excessive bleeding (blood soaking through dressing) or unexpected drainage from the wound.  Extreme redness or swelling around the incision site, drainage of pus or foul smelling drainage.  Inability to urinate or empty your bladder within 8 hours.  Problems with breathing or chest pain.    You should call 911 if you develop problems with breathing or chest pain.  If you are unable to contact your doctor or surgical center, you should go to the nearest emergency room or urgent care  center.  Physician's telephone #: [phone]    If any questions arise, call your doctor.  If your doctor is not available, please feel free to call the Surgical Center at [phone].     A registered nurse may call you a few days after your surgery to see how you are doing after your procedure.    MEDICATIONS: Resume taking daily medication.  Take prescribed pain medication with food.  If no medication is prescribed, you may take non-aspirin pain medication if needed.  PAIN MEDICATION CAN BE VERY CONSTIPATING.  Take a stool softener or laxative such as senokot, pericolace, or milk of magnesia if needed.    Last pain medication given at Oxycodone 10mg given at 1:05 PM in the recovery room.    If your physician has prescribed pain medication that includes Acetaminophen (Tylenol), do not take additional Acetaminophen (Tylenol) while taking the prescribed medication.

## 2024-06-07 NOTE — OR NURSING
1232: To PACU from OR via gurney, awake, respirations spontaneous and non-labored. Stable on 6L O2 via mask Ice pack applied over c/d/i mouth surgical dressings.    1240: Pt c/o pain 5/10, medicated per prn orders. Pt c/o nausea, medicated per prn orders. Pt given only IV meds at this time  Dt nausea. Pt stable on 2L NC.    1250: Pt c/o 6/10 pain, subsequent dose provided.      1305: Pt c/o 7/10 pain, medicated per prn orders. Oral pain meds given via syringe as well as water.  Pt tolerated both well. Denies nausea. Pt remains stable on 1L NC.    1315: Pt states pain remains 6/10, dilaudid given at this time. Pt remains stable on 1L NC.     1325: Subsequent dose given for continued pain 6/10. Pt placed on RA.     1330: Updated pt family via phone    1345: Report given to Rayne TIAN    1420: Report received from Rayne TIAN. Pt states pain remains 5/10. Pt dressed and up to chair. Stable on RA.     1435: Pt remains in 5/10 pain. Updated  of ineffective pain management.     1445: New orders received from .     1500: Updated pt family. Pt remains up in chair. Toradol and tylenol given at this time. Pt remains stable on RA.     1515: No change.    1520: Pt noted to drop to 79% on RA when falling asleep. Pt able to return O2 sat to >90% when awake. Will continue to monitor.    1535: Pt O2 sat continues to dip when resting to 85% on RA. Pt able to recover to 94% when awake.     1545: Meets criteria to transfer to Stage 2    1605: Pt O2 sat remains >92% on RA. Pt arrive to stage 2 via gurney. Denies nausea. States pain is tolerable 4/10.  Stable on RA. Family present. Pt able to void prior to Dc home. DC education provided to pt and pt family, both verbalized understanding. D/Augusto to care of family post uneventful stay in PACU 2. Assisted out to car in .

## 2024-06-07 NOTE — ANESTHESIA TIME REPORT
Anesthesia Start and Stop Event Times       Date Time Event    6/7/2024 1000 Ready for Procedure     1059 Anesthesia Start     1235 Anesthesia Stop          Responsible Staff  06/07/24      Name Role Begin End    Matt Goldman M.D. Anesth 1059 1235          Overtime Reason:  no overtime (within assigned shift)    Comments:

## 2024-06-07 NOTE — ANESTHESIA POSTPROCEDURE EVALUATION
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 Responsible Provider: Matt Goldman M.D.    Anesthesia Type: general ASA Status: 3            Final Anesthesia Type: general  Last vitals  BP   Blood Pressure : 120/58    Temp   36.6 °C (97.9 °F)    Pulse   (!) 57   Resp   16    SpO2   90 %      Anesthesia Post Evaluation    Patient location during evaluation: PACU  Patient participation: complete - patient participated  Level of consciousness: awake and alert  Pain score: 0    Airway patency: patent  Anesthetic complications: no  Cardiovascular status: hemodynamically stable  Respiratory status: acceptable  Hydration status: euvolemic    PONV: none          No notable events documented.     Nurse Pain Score: 6 (NPRS)

## 2024-06-07 NOTE — ANESTHESIA PROCEDURE NOTES
Airway    Date/Time: 6/7/2024 11:05 AM    Performed by: Matt Goldman M.D.  Authorized by: Matt Goldman M.D.    Location:  OR  Urgency:  Elective  Difficult Airway: No    Indications for Airway Management:  Anesthesia      Spontaneous Ventilation: absent    Sedation Level:  Deep  Preoxygenated: Yes    Patient Position:  Sniffing  Mask Difficulty Assessment:  1 - vent by mask  Final Airway Type:  Endotracheal airway  Final Endotracheal Airway:  ETT  Cuffed: Yes    Technique Used for Successful ETT Placement:  Direct laryngoscopy  Devices/Methods Used in Placement:  Anterior pressure/BURP    Insertion Site:  Oral  Blade Type:  Youngblood  Laryngoscope Blade/Videolaryngoscope Blade Size:  2  ETT Size (mm):  7.0  Measured from:  Teeth  ETT to Teeth (cm):  23  Placement Verified by: auscultation and capnometry    Cormack-Lehane Classification:  Grade IIa - partial view of glottis  Number of Attempts at Approach:  1

## 2024-06-07 NOTE — OR NURSING
Patient allergies and NPO status verified, home medication reconciliation completed, belongings secured. Patient verbalizes understanding of pain scale, expected course of stay and plan of care. Surgical site verified with patient, IV access established sequentials and JEAN hose placed on BLE.

## 2024-06-07 NOTE — OR NURSING
1345 Received report from Cait TIAN. Pt resting on gurney, respirations easy and unlabored. Band-aids to upper and lower lips, serosanguinous drainage noted to dressings. Pt holding ice pack to face. States pain 5-6/10, medicated per MAR.     1407 Pt states pain 5/10.     1421 Pt ambulated to bathroom and back to [place]. Assisted pt with dressing. Pt sitting up in recliner. Tolerating PO fluids. Report back to Cait TIAN.

## 2024-06-08 NOTE — OP REPORT
DATE OF SERVICE:  06/07/2024     SERVICE:  Plastic surgery.     SURGEON:  Erick Hunter MD.     ANESTHESIOLOGIST:  Matt Goldman MD.     ANESTHESIA:  General.     PREOPERATIVE DIAGNOSES:  Painful dog bite deformities and scarring of the right upper and lower lips, and gingival buccal mucosa, painful hypertrophic scar contracture deformities of the right upper lip and mid lower lip, and upper lip frenulum and oral mucosal scarring secondary to dog bite injury.     POSTOPERATIVE DIAGNOSES:  Painful dog bite deformities and scarring of the right upper and lower lips, and gingival buccal mucosa, painful hypertrophic scar contracture deformities of the right upper lip and mid lower lip, and upper lip frenulum and oral mucosal scarring secondary to dog bite injury.     OPERATIVE PROCEDURE:  Excision of painful right upper lip hypertrophic scar contracture with skin and muscle transposition flaps, upper lip frenulectomy and mucosal hypertrophic scar excision with Z plasty transposition flaps, and mid lower lip hypertrophic scar excision with advancement flap closure.     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  She was initially treated by another plastic surgeon in the emergency room at [facility] and has had painful hypertrophic scarring with contracture, very noticeable deformities of the upper and lower lip as well as pain and irritation of the frenulum and oral mucosa.  The patient has been treating this with scar cream and gentle stretch and massage, however, it has not improved and has resulted   in a noticeable notch deformity of the mid right upper lip with tightness as well as firm,   tender scar contractures of the upper and lower lips and tenderness with   tightness of the frenulum and oral mucosa of the upper lip.  The patient will    undergo these procedures under general anesthesia as an outpatient and   understands risks, benefits and alternatives including but not limited to the   risks of bleeding, hematoma, seroma, infection, wound dehiscence, painful or   unsightly scarring, hypertrophic or keloid scarring, painful neuroma, sensory   loss or decrease, injury to muscle, injury to ribs, pneumothorax, DVT,   pulmonary embolism, fat embolism; skin, fat and muscle necrosis; persistent or  recurrent hypertrophic and painful scars, persistent or recurrent scar   contracture and notch deformity, asymmetry or irregularities, abnormal or   asymmetrical smile, inability to completely close the mouth, oral incontinence, tight lip deformity, injury to teeth and gums, injury to the tongue, difficulty with speech, eating and swallowing, complications related to sutures and bacitracin ointment, changes with weight gain; changes with medication, health condition, trauma, infection, sun exposure, and trauma; cardiovascular and cardiorespiratory compromise, aspiration pneumonitis, atelectasis, hemorrhage, need for transfusion, mortality, and need for future revision.  The patient is motivated and signed informed consent.     OPERATIVE REPORT:  The patient was evaluated preoperatively and then taken to   the OR where she was prepped and draped in supine position under general   anesthesia.  Sequential stockings were placed.  All of the surgical areas   including the mid right upper white lip, red lip, oral mucosa and frenulum as   well as lower lip scars were infiltrated with 0.25% bupivacaine with 1:100,000  epinephrine.  Starting with the mid lower lip, she has a scar measuring   approximately 2.2 x 1 cm.  Using a 15 blade, a transverse elliptical incision   was made just above the vermilion border and below excising a 3 x 1 cm area of  mid lower lip hypertrophic scar superficially and deep.  Hemostasis secured   using electrocautery and wound irrigated  with sterile saline.  Undermining   performed and an advancement flap closure was performed using interrupted   deep buried 5-0 Monocryl suture followed by interrupted buried dermal 5-0 Monocryl   suture and running 6-0 nylon suture in the skin.  A significant improvement   was noted.  Bacitracin ointment applied.  Attention was then focused to the   right mid upper lip where the vertical scar is hypertrophic and preeti   and widened more inferiorly towards the vermilion border.  This scar measures   approximately 2.5 cm x 1.5 cm.  Using a 15 blade, a vertical elliptical   incision was made from the upper aspect of the mid right upper lip down to the  vermilion border, across the vermilion border and into the red lip. An   elliptical incision was made obliquely oriented excising the superficial scar   and deep scar.  Once these were excised including the skin, subcutaneous   tissue and muscle scar, undermining was performed above and below the   orbicularis oris muscle and further scar resected.  There was a slight   separation of the muscle with scar interposition from the injury.  Once the   edges of the orbicularis oculi oris muscle were cleaned and realigned, a flap   of orbicularis oris muscle was then repositioned to help improve the notch   deformity.  This was closed with interrupted buried 5-0 Monocryl sutures   reapproximating the muscle in a better position, correcting the notch   deformity and the separation.  Once the muscle flap was completed, further   undermining was performed and the skin portion of the right upper lip was   reapproximated using interrupted buried 5-0 Monocryl sutures first   reapproximating the vermilion border and the white roll.  This was very   successful and excess skin was resected and closure with interrupted buried   dermal 5-0 Monocryl sutures followed by running 6-0 nylon in the skin,   completing the advancement flap.  The red lip scar was further  excised,  undermining performed and the Z plasty transposition flaps since there   was a significant mismatch in the size of the lateral aspect versus the medial  aspect of the red lip.  Once this was improved, completely correcting the   notch deformity, this was sutured in place using a number of interrupted 5-0   chromic gut sutures.  Finally, further bulk was obtained by advancing a   mucosal flap, which was sutured with interrupted 5-0 chromic gut sutures.    Significant improvement of the notch deformity and contracture was noted.    Finally, gently retracting the upper lip, the frenulum was transected distally  as was a hypertrophic scar in the mucosa to the right and adjacent to the   frenulum.  Frenulectomy was performed as well as excision of the hypertrophic   mucosal scar.  Undermining and Z-plasty transposition flaps was performed,   to lengthen and reduce tension on the frenulum and oral mucosa where the flaps were sutured in place using interrupted 5-0 chromic gut suture.  Significant release was noted. Bacitracin ointment was applied to the skin suture lines followed by sterile Band-Aids.  In total, she had approximately 12 square cm of transposition and advancement flap closures.  Negligible bleeding and no complications.  The patient was awakened from anesthesia, extubated and returned to recovery room in stable condition.        ______________________________  MD JENNY Zimmer/CRISTAL    DD:  06/07/2024 18:45  DT:  06/07/2024 19:16    Job#:  [id number]

## 2024-08-27 ENCOUNTER — HOSPITAL ENCOUNTER (OUTPATIENT)
Dept: LAB | Facility: MEDICAL CENTER | Age: 69
End: 2024-08-27
Attending: NURSE PRACTITIONER
Payer: MEDICARE

## 2024-08-27 LAB
ALBUMIN SERPL BCP-MCNC: 4.1 G/DL (ref 3.2–4.9)
ALBUMIN/GLOB SERPL: 1.7 G/DL
ALP SERPL-CCNC: 96 U/L (ref 30–99)
ALT SERPL-CCNC: 27 U/L (ref 2–50)
ANION GAP SERPL CALC-SCNC: 11 MMOL/L (ref 7–16)
AST SERPL-CCNC: 36 U/L (ref 12–45)
BILIRUB SERPL-MCNC: 0.3 MG/DL (ref 0.1–1.5)
BUN SERPL-MCNC: 10 MG/DL (ref 8–22)
CALCIUM ALBUM COR SERPL-MCNC: 9.3 MG/DL (ref 8.5–10.5)
CALCIUM SERPL-MCNC: 9.4 MG/DL (ref 8.5–10.5)
CHLORIDE SERPL-SCNC: 107 MMOL/L (ref 96–112)
CHOLEST SERPL-MCNC: 163 MG/DL (ref 100–199)
CO2 SERPL-SCNC: 23 MMOL/L (ref 20–33)
CREAT SERPL-MCNC: 0.61 MG/DL (ref 0.5–1.4)
GFR SERPLBLD CREATININE-BSD FMLA CKD-EPI: 96 ML/MIN/1.73 M 2
GLOBULIN SER CALC-MCNC: 2.4 G/DL (ref 1.9–3.5)
GLUCOSE SERPL-MCNC: 79 MG/DL (ref 65–99)
HDLC SERPL-MCNC: 81 MG/DL
LDLC SERPL CALC-MCNC: 68 MG/DL
POTASSIUM SERPL-SCNC: 4.7 MMOL/L (ref 3.6–5.5)
PROT SERPL-MCNC: 6.5 G/DL (ref 6–8.2)
SODIUM SERPL-SCNC: 141 MMOL/L (ref 135–145)
TRIGL SERPL-MCNC: 68 MG/DL (ref 0–149)
TSH SERPL-ACNC: 0.7 UIU/ML (ref 0.35–5.5)

## 2024-08-27 PROCEDURE — 80053 COMPREHEN METABOLIC PANEL: CPT

## 2024-08-27 PROCEDURE — 84443 ASSAY THYROID STIM HORMONE: CPT

## 2024-08-27 PROCEDURE — 36415 COLL VENOUS BLD VENIPUNCTURE: CPT

## 2024-08-27 PROCEDURE — 80061 LIPID PANEL: CPT

## 2024-11-10 ENCOUNTER — OFFICE VISIT (OUTPATIENT)
Dept: URGENT CARE | Facility: PHYSICIAN GROUP | Age: 69
End: 2024-11-10
Payer: MEDICARE

## 2024-11-10 VITALS
RESPIRATION RATE: 14 BRPM | TEMPERATURE: 98.5 F | WEIGHT: 149 LBS | SYSTOLIC BLOOD PRESSURE: 124 MMHG | HEIGHT: 66 IN | OXYGEN SATURATION: 97 % | DIASTOLIC BLOOD PRESSURE: 80 MMHG | BODY MASS INDEX: 23.95 KG/M2 | HEART RATE: 63 BPM

## 2024-11-10 DIAGNOSIS — R11.0 NAUSEA WITHOUT VOMITING: ICD-10-CM

## 2024-11-10 DIAGNOSIS — J04.0 LARYNGITIS WITHOUT OBSTRUCTION, ACUTE: ICD-10-CM

## 2024-11-10 DIAGNOSIS — R19.7 DIARRHEA, UNSPECIFIED TYPE: ICD-10-CM

## 2024-11-10 LAB — S PYO DNA SPEC NAA+PROBE: NOT DETECTED

## 2024-11-10 PROCEDURE — 87651 STREP A DNA AMP PROBE: CPT

## 2024-11-10 PROCEDURE — 99203 OFFICE O/P NEW LOW 30 MIN: CPT

## 2024-11-10 PROCEDURE — 1125F AMNT PAIN NOTED PAIN PRSNT: CPT

## 2024-11-10 PROCEDURE — 3074F SYST BP LT 130 MM HG: CPT

## 2024-11-10 PROCEDURE — 3079F DIAST BP 80-89 MM HG: CPT

## 2024-11-10 RX ORDER — PREDNISONE 10 MG/1
20 TABLET ORAL DAILY
Qty: 6 TABLET | Refills: 0 | Status: SHIPPED | OUTPATIENT
Start: 2024-11-10 | End: 2024-11-13

## 2024-11-10 RX ORDER — ONDANSETRON 4 MG/1
4 TABLET, ORALLY DISINTEGRATING ORAL EVERY 6 HOURS PRN
Qty: 15 TABLET | Refills: 0 | Status: SHIPPED | OUTPATIENT
Start: 2024-11-10

## 2024-11-10 RX ORDER — LIDOCAINE HYDROCHLORIDE 20 MG/ML
SOLUTION OROPHARYNGEAL
Qty: 100 ML | Refills: 0 | Status: SHIPPED | OUTPATIENT
Start: 2024-11-10

## 2024-11-10 ASSESSMENT — ENCOUNTER SYMPTOMS
ABDOMINAL PAIN: 0
CONSTIPATION: 0
CHILLS: 0
HEARTBURN: 0
SORE THROAT: 1
DIARRHEA: 1
SINUS PAIN: 0
WEIGHT LOSS: 0
BACK PAIN: 0
BLOOD IN STOOL: 0
NAUSEA: 1
FEVER: 0
MYALGIAS: 0
EYE DISCHARGE: 0
VOMITING: 0
EYE PAIN: 0

## 2024-11-10 ASSESSMENT — FIBROSIS 4 INDEX: FIB4 SCORE: 2.23

## 2024-11-10 ASSESSMENT — PAIN SCALES - GENERAL: PAINLEVEL_OUTOF10: 4=SLIGHT-MODERATE PAIN

## 2024-11-10 NOTE — LETTER
November 10, 2024    To Whom It May Concern:         This is confirmation that Chloé Cummings attended her scheduled appointment with MAGDALENA Hou on 11/10/24.  She may return to work on 11/12/2024 as long as she remains afebrile without the use of fever reducers in the last 24 hours         If you have any questions please do not hesitate to call me at the phone number listed below.    Sincerely,          NETO Hou.  [phone]

## 2024-11-10 NOTE — PROGRESS NOTES
Subjective:   Chloé Cummings is a 69 y.o. female who presents for Pharyngitis (Headache, congested x 6 days )      Patient presents with complaints of loss of voice/sore throat, intermittent nausea without vomiting, and diarrhea for the last 6 days.  Patient states 6 days ago her symptoms came on suddenly with loss of voice and laryngitis and over the week she has had increased diarrhea intermittent nausea.  She denies any abdominal pain, denies any stridor, wheezing, chest pain, or any swollen tongue or throat.  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  She denies fever and chills, does endorse intermittent body aches    Pharyngitis   Associated symptoms include diarrhea. Pertinent negatives include no abdominal pain, congestion or vomiting.       Review of Systems   Constitutional:  Negative for chills, fever, malaise/fatigue and weight loss.   HENT:  Positive for sore throat. Negative for congestion and sinus pain.    Eyes:  Negative for pain and discharge.   Cardiovascular:  Negative for chest pain.   Gastrointestinal:  Positive for diarrhea and nausea. Negative for abdominal pain, blood in stool, constipation, heartburn, melena and vomiting.   Musculoskeletal:  Negative for back pain, joint pain and myalgias.   Skin:  Negative for rash.     Refer to HPI for additional details.    During this visit, appropriate PPE was worn, and hand hygiene was performed.    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    MEDS:   Current Outpatient Medications:     ondansetron (ZOFRAN ODT) 4 MG TABLET DISPERSIBLE, Take 1 Tablet by mouth every 6 hours as needed for Nausea/Vomiting for up to 15 doses., Disp: 15  Tablet, Rfl: 0    predniSONE (DELTASONE) 10 MG Tab, Take 2 Tablets by mouth every day for 3 days., Disp: 6 Tablet, Rfl: 0    lidocaine (XYLOCAINE) 2 % Solution, 5mL orally, may be applied as a swish and spit up to three times daily as needed for throat pain, Disp: 100 mL, Rfl: 0    Vitamin D, Cholecalciferol, 50 MCG (2000 UT) Cap, Take  by mouth every day. DO NOT TAKE 7 DAYS PRIOR TO SURGERY, Disp: , Rfl:     Calcium Carb-Cholecalciferol (CALCIUM 500 + D PO), Take  by mouth every day. DO NOT TAKE 7 DAYS PRIOR TO SURGERY, Disp: , Rfl:     acetaminophen (TYLENOL) 325 MG Tab, Take 650 mg by mouth every 6 hours as needed. CONTINUE TAKING PRIOR TO SURGERY AND CAN TAKE DAY OF SURGERY, Disp: , Rfl:     atorvastatin (LIPITOR) 40 MG Tab, Take 1 Tablet by mouth every evening. (Patient taking differently: Take 40 mg by mouth every evening. CONTINUE TAKING MED PRIOR TO SURGERY), Disp: 30 Tablet, Rfl: 0    metoprolol SR (TOPROL XL) 25 MG TABLET SR 24 HR, Take 1 Tablet by mouth every day. (Patient taking differently: Take 25 mg by mouth every evening. CONTINUE TAKING MED PRIOR TO SURGERY), Disp: 30 Tablet, Rfl: 0    amphetamine-dextroamphetamine (ADDERALL) 10 MG Tab, Take 5 mg by mouth see administration instructions. 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 WITH MUSCLE AND SKIN TRANSPOSITION FLAPS, UPPER LIP FRENULECTOMY AND MID LOWER SCAR EXCISION;  Surgeon: Abilio Hunter M.D.;  Location: SURGERY [facility];  Service: Plastics    FLAP GRAFT Right 6/7/2024    Procedure: FLAP GRAFT;  Surgeon: Abilio Hunter M.D.;  Location: [facility];  Service: Plastics    RHYTIDECTOMY Right 6/7/2024    Procedure: RHYTIDECTOMY, FACE;  Surgeon: Abilio BOCANEGRA  "SANDY Hunter;  Location: SURGERY [facility];  Service: Plastics    ANGIOGRAM  04/14/2024    pt reports vessels clear    CATARACT EXTRACTION WITH IOL Bilateral 2021    CRANIOTOMY  01/01/2011    Meningioma    ARTHROSCOPY, KNEE Bilateral     SHOULDER ARTHROSCOPY Right      SOCHX:  reports that she has never smoked. She has never used smokeless tobacco. She reports current alcohol use. She reports that she does not use drugs.    FH: Per HPI as applicable/pertinent.    Medications, Allergies, and current problem list reviewed today in Epic.     Objective:     /80 (BP Location: Right arm, Patient Position: Sitting, BP Cuff Size: Adult)   Pulse 63   Temp 36.9 °C (98.5 °F) (Temporal)   Resp 14   Ht 1.676 m (5' 6\")   Wt 67.6 kg (149 lb)   SpO2 97%     Physical Exam  Vitals reviewed.   Constitutional:       General: She is not in acute distress.     Appearance: She is normal weight. She is not ill-appearing.   HENT:      Head: Normocephalic and atraumatic.      Right Ear: Tympanic membrane, ear canal and external ear normal.      Left Ear: Tympanic membrane, ear canal and external ear normal.      Nose: No congestion or rhinorrhea.      Mouth/Throat:      Mouth: Mucous membranes are moist.      Pharynx: Posterior oropharyngeal erythema present. No pharyngeal swelling, oropharyngeal exudate, uvula swelling or postnasal drip.      Tonsils: No tonsillar exudate or tonsillar abscesses. 2+ on the right. 2+ on the left.        Comments: Posterior oropharynx and tonsillar erythema, no exudates noted, uvula midline no PTA appreciated.  Airways clear no obvious obstructions noted no stridor or wheezing   Eyes:      General:         Right eye: No discharge.         Left eye: No discharge.      Pupils: Pupils are equal, round, and reactive to light.   Cardiovascular:      Rate and Rhythm: Normal rate.   Pulmonary:      Effort: Pulmonary effort is normal. No respiratory distress.      Breath sounds: No stridor. No wheezing, " rhonchi or rales.   Chest:      Chest wall: No tenderness.   Abdominal:      General: Abdomen is flat.      Tenderness: There is no abdominal tenderness. There is no guarding.   Musculoskeletal:      Cervical back: Normal range of motion. Tenderness present. No rigidity.   Lymphadenopathy:      Cervical: No cervical adenopathy.   Neurological:      Mental Status: She is alert.         Assessment/Plan:     Diagnosis and associated orders:     1. Laryngitis without obstruction, acute  - POCT CEPHEID GROUP A STREP - PCR  - predniSONE (DELTASONE) 10 MG Tab; Take 2 Tablets by mouth every day for 3 days.  Dispense: 6 Tablet; Refill: 0  - lidocaine (XYLOCAINE) 2 % Solution; 5mL orally, may be applied as a swish and spit up to three times daily as needed for throat pain  Dispense: 100 mL; Refill: 0    2. Nausea without vomiting  - POCT CEPHEID GROUP A STREP - PCR  - ondansetron (ZOFRAN ODT) 4 MG TABLET DISPERSIBLE; Take 1 Tablet by mouth every 6 hours as needed for Nausea/Vomiting for up to 15 doses.  Dispense: 15 Tablet; Refill: 0    3. Diarrhea, unspecified type  - POCT CEPHEID GROUP A STREP - PCR     Comments/MDM:     Patient history and physical exam are consistent with acute laryngitis with concomitant diarrhea and intermittent nausea without vomiting.  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  No red flag symptoms endorsed, this point she is out of any treatment window for viral etiology, though she has had multiple strep contacts and is having laryngitis/sore throat so we will rule out strep etiology  In clinic strep swab negative  Outpatient management will consist of viscous lidocaine gargle swish and spit for throat pain, short 3-day burst of prednisone throat swelling laryngitis, Zofran as needed for nausea, increase hydration with low sugar low-sodium fluids, Tylenol/ibuprofen as staggered stack  schedule, change toothbrush strict hand hygiene  Follow up in 3-5 days if no improvement in symptoms  ER precautions given for any trouble breathing, shortness of breath, chest pain, stridor or throat swelling or tongue swelling         Differential diagnosis, natural history, supportive care, and indications for immediate follow-up discussed.    Advised the patient to follow-up with the primary care physician for recheck, reevaluation, and consideration of further management.    Please note that this dictation was created using voice recognition software. I have made a reasonable attempt to correct obvious errors, but I expect that there are errors of grammar and possibly content that I did not discover before finalizing the note.    This note was electronically signed by MAGDALENA Hou

## 2025-04-30 ENCOUNTER — TELEPHONE (OUTPATIENT)
Dept: CARDIOLOGY | Facility: MEDICAL CENTER | Age: 70
End: 2025-04-30
Payer: MEDICARE

## 2025-04-30 NOTE — TELEPHONE ENCOUNTER
Called patient in regards to records for NP appointment with Dr. BOSE To review most recent lab results, ekg, any cardiac testing or ,if patient has been treated by a cardiologist. No answer, left voicemail to call back

## 2025-05-16 ENCOUNTER — OFFICE VISIT (OUTPATIENT)
Dept: CARDIOLOGY | Facility: MEDICAL CENTER | Age: 70
End: 2025-05-16
Attending: INTERNAL MEDICINE
Payer: MEDICARE

## 2025-05-16 VITALS
HEART RATE: 81 BPM | OXYGEN SATURATION: 96 % | BODY MASS INDEX: 23.63 KG/M2 | SYSTOLIC BLOOD PRESSURE: 108 MMHG | RESPIRATION RATE: 16 BRPM | DIASTOLIC BLOOD PRESSURE: 64 MMHG | HEIGHT: 66 IN | WEIGHT: 147 LBS

## 2025-05-16 DIAGNOSIS — I51.81 STRESS-INDUCED CARDIOMYOPATHY: ICD-10-CM

## 2025-05-16 DIAGNOSIS — E78.5 HYPERLIPIDEMIA, UNSPECIFIED HYPERLIPIDEMIA TYPE: ICD-10-CM

## 2025-05-16 DIAGNOSIS — I25.10 NONOBSTRUCTIVE ATHEROSCLEROSIS OF CORONARY ARTERY: ICD-10-CM

## 2025-05-16 DIAGNOSIS — Z00.00 ENCOUNTER FOR MEDICAL EXAMINATION TO ESTABLISH CARE: Primary | ICD-10-CM

## 2025-05-16 PROBLEM — I42.9 CARDIOMYOPATHY (HCC): Status: ACTIVE | Noted: 2024-04-13

## 2025-05-16 LAB — EKG IMPRESSION: NORMAL

## 2025-05-16 PROCEDURE — 93005 ELECTROCARDIOGRAM TRACING: CPT | Mod: TC | Performed by: INTERNAL MEDICINE

## 2025-05-16 PROCEDURE — 99212 OFFICE O/P EST SF 10 MIN: CPT | Performed by: INTERNAL MEDICINE

## 2025-05-16 RX ORDER — ROSUVASTATIN CALCIUM 20 MG/1
20 TABLET, COATED ORAL EVERY EVENING
Qty: 100 TABLET | Refills: 3 | Status: SHIPPED | OUTPATIENT
Start: 2025-05-16 | End: 2026-06-20

## 2025-05-16 ASSESSMENT — ENCOUNTER SYMPTOMS
IRREGULAR HEARTBEAT: 0
WEIGHT LOSS: 0
HEARTBURN: 0
ORTHOPNEA: 0
NEAR-SYNCOPE: 0
DIZZINESS: 0
NAUSEA: 0
PALPITATIONS: 0
DEPRESSION: 0
DIARRHEA: 0
DYSPNEA ON EXERTION: 0
BLURRED VISION: 0
SYNCOPE: 0
WEIGHT GAIN: 0
COUGH: 0
PND: 0
BACK PAIN: 0
FEVER: 0
ABDOMINAL PAIN: 0
CLAUDICATION: 0
FLANK PAIN: 0
CONSTIPATION: 0
SHORTNESS OF BREATH: 0
DECREASED APPETITE: 0
ALTERED MENTAL STATUS: 0
VOMITING: 0

## 2025-05-16 ASSESSMENT — FIBROSIS 4 INDEX: FIB4 SCORE: 2.23

## 2025-05-16 NOTE — PROGRESS NOTES
"Cardiology Note    Chief Complaint   Patient presents with    Other     NP establish care       History of Present Illness: Chloé Cummings is a 69 y.o. female PMH SVT s/p ablation, HLD, fam hx CAD, bipolar, nonobstructive CAD on [facility], HFmrEF found stress CM related to dog bite 4/2024 who presents for initial visit.     Outside records:  \"8/23/24 Echo - 1. The left ventricle is normal in size and systolic function with EF 60 to 65% by Ballard's biplane. Mild concentric left ventricular hypertrophy. There is normal LV segmental wall motion. There is grade 1 (impaired relaxation) diastolic function with preserved LV filling pressures. No VSD or apical thrombus noted.  2. The aortic valve is trileaflet with mild sclerosis without stenosis.  3. Mild mitral insufficiency with multiple jets.  4. The right ventricle is normal in size and function. Estimated RVSP = 23 mmHg (RAP = 3 mmHg) is normal.  Comparison to prior exam on 2/27/20; No significant changes noted.   (My note----> Improved from 4/14/24 Echo which showed EF 45%)  Cardiac Cath Lab:  4/14/24 Left Heart Cath -   CORONARY ANGIOGRAPHY:  The left main coronary artery is patent and bifurcates into the left anterior descending and left circumflex coronary arteries.  The left anterior descending coronary artery the left anterior descending coronary is a large, transapical vessel with a mild mid vessel intramyocardial bridge associated with very mild coronary plaque. The LAD supplies a large first diagonal branch with ostial 30% disease.  The left circumflex coronary artery is a large, nondominant vessel that supplies a moderate first obtuse marginal branch, a small second obtuse marginal branch, and a small posterolateral branch and has no angiographically significant disease.  The right coronary artery is a large, dominant vessel with very mild proximal plaque. Distally, it bifurcates into a moderate posterior descending coronary and a small posterolateral branch " "with no angiographically significant disease.  IMPRESSION:  Very mild, nonobstructive coronary artery disease.  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  Normal left heart filling pressures.  RECOMMENDATIONS:  Optimal medical management of suspected stress-mediated cardiomyopathy.\"      More recently past few months has felt \"exhausted\" feels very fatigued. Has woken up at night pretty frequently. Describes 6h sleep is a good night. Also describes palpitations however she means this of heart rate  bpm. Taking aderrall for work. Previously nurse now teaching in school. Taking aderrall for focus. No other cardiac complaints. Family history coronary disease in mother. No toxic social habits.    Review of Systems   Constitutional: Negative for decreased appetite, fever, malaise/fatigue, weight gain and weight loss.   HENT:  Negative for congestion and nosebleeds.    Eyes:  Negative for blurred vision.   Cardiovascular:  Negative for chest pain, claudication, dyspnea on exertion, irregular heartbeat, leg swelling, near-syncope, orthopnea, palpitations, paroxysmal nocturnal dyspnea and syncope.   Respiratory:  Negative for cough and shortness of breath.    Endocrine: Negative for cold intolerance and heat intolerance.   Skin:  Negative for rash.   Musculoskeletal:  Negative for back pain.   Gastrointestinal:  Negative for abdominal pain, constipation, diarrhea, heartburn, melena, nausea and vomiting.   Genitourinary:  Negative for dysuria, flank pain and hematuria.   Neurological:  Negative for dizziness.   Psychiatric/Behavioral:  Negative for altered mental status and depression.          Past Medical History[1]      Past Surgical History[2]      Current Medications[3]      Allergies[4]      Family History   Problem Relation Age of Onset    Stroke Mother     Heart Attack Mother     Cancer Father         Lung    Stroke Sister     Heart Attack Maternal Grandmother          Social History " "    Socioeconomic History    Marital status: Single     Spouse name: Not on file    Number of children: Not on file    Years of education: Not on file    Highest education level: Not on file   Occupational History    Not on file   Tobacco Use    Smoking status: Never    Smokeless tobacco: Never   Vaping Use    Vaping status: Never Used   Substance and Sexual Activity    Alcohol use: Yes     Comment: one drink twice a year    Drug use: No    Sexual activity: Not Currently   Other Topics Concern    Not on file   Social History Narrative    Not on file     Social Drivers of Health     Financial Resource Strain: Not on file   Food Insecurity: Not on file   Transportation Needs: Not on file   Physical Activity: Not on file   Stress: Not on file   Social Connections: Not on file   Intimate Partner Violence: Not on file   Housing Stability: Not on file         Physical Exam:  Ambulatory Vitals  /64 (BP Location: Left arm, Patient Position: Sitting, BP Cuff Size: Adult)   Pulse 81   Resp 16   Ht 1.676 m (5' 6\")   Wt 66.7 kg (147 lb)   SpO2 96%    BP Readings from Last 4 Encounters:   05/16/25 108/64   11/10/24 124/80   06/07/24 127/64   04/15/24 117/66     Weight/BMI:   Vitals:    05/16/25 0814   BP: 108/64   Weight: 66.7 kg (147 lb)   Height: 1.676 m (5' 6\")    Body mass index is 23.73 kg/m².  Wt Readings from Last 4 Encounters:   05/16/25 66.7 kg (147 lb)   11/10/24 67.6 kg (149 lb)   06/07/24 62 kg (136 lb 11 oz)   04/15/24 63.9 kg (140 lb 14 oz)       Physical Exam  Constitutional:       General: She is not in acute distress.  HENT:      Head: Normocephalic and atraumatic.   Eyes:      Conjunctiva/sclera: Conjunctivae normal.      Pupils: Pupils are equal, round, and reactive to light.   Neck:      Vascular: No JVD.   Cardiovascular:      Rate and Rhythm: Normal rate and regular rhythm.      Heart sounds: Normal heart sounds. No murmur heard.     No friction rub. No gallop.   Pulmonary:      Effort: Pulmonary " "effort is normal. No respiratory distress.      Breath sounds: Normal breath sounds. No wheezing or rales.   Chest:      Chest wall: No tenderness.   Abdominal:      General: Bowel sounds are normal. There is no distension.      Palpations: Abdomen is soft.   Musculoskeletal:      Cervical back: Normal range of motion and neck supple.   Skin:     General: Skin is warm and dry.   Neurological:      Mental Status: She is alert and oriented to person, place, and time.   Psychiatric:         Mood and Affect: Affect normal.         Judgment: Judgment normal.         Lab Data Review:  Lab Results   Component Value Date/Time    CHOLSTRLTOT 163 08/27/2024 08:14 AM    LDL 68 08/27/2024 08:14 AM    HDL 81 08/27/2024 08:14 AM    TRIGLYCERIDE 68 08/27/2024 08:14 AM       Lab Results   Component Value Date/Time    SODIUM 141 08/27/2024 08:14 AM    POTASSIUM 4.7 08/27/2024 08:14 AM    CHLORIDE 107 08/27/2024 08:14 AM    CO2 23 08/27/2024 08:14 AM    GLUCOSE 79 08/27/2024 08:14 AM    BUN 10 08/27/2024 08:14 AM    CREATININE 0.61 08/27/2024 08:14 AM    BUNCREATRAT 21.4 06/01/2022 01:26 PM     CrCl cannot be calculated (Patient's most recent lab result is older than the maximum 7 days allowed.).  Lab Results   Component Value Date/Time    ALKPHOSPHAT 96 08/27/2024 08:14 AM    ASTSGOT 36 08/27/2024 08:14 AM    ALTSGPT 27 08/27/2024 08:14 AM    TBILIRUBIN 0.3 08/27/2024 08:14 AM      Lab Results   Component Value Date/Time    WBC 6.9 04/15/2024 12:33 AM     Lab Results   Component Value Date/Time    HBA1C 5.4 04/13/2024 09:42 PM     No components found for: \"TROP\"      Cardiac Imaging and Procedures Review:      EKG 5/16/25 interpreted by me sinus 72 bpm, rsr'    Medical Decision Making:  Problem List Items Addressed This Visit       Encounter for medical examination to establish care - Primary    Relevant Orders    EKG (Completed)    Cardiomyopathy (HCC)    Relevant Medications    rosuvastatin (CRESTOR) 20 MG Tab    Hyperlipidemia    " Relevant Medications    rosuvastatin (CRESTOR) 20 MG Tab    Nonobstructive atherosclerosis of coronary artery    Relevant Medications    rosuvastatin (CRESTOR) 20 MG Tab       Stress CM - stable. Resolved. Continue metoprolol long term. Reduce stimulant use to minimum necessary. 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     HLD - change statin to rosuvastatin due to concern of headache with atorvastatin. Threshold goal LDL <70 setting nonobstructive CAD on [facility].     It was my pleasure to meet with Ms. Cummings.                             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 WITH MUSCLE AND SKIN TRANSPOSITION FLAPS, UPPER LIP FRENULECTOMY AND MID LOWER SCAR EXCISION;  Surgeon: Abilio Hunter M.D.;  Location: SURGERY [facility];  Service: Plastics    FLAP GRAFT Right 6/7/2024    Procedure: FLAP GRAFT;  Surgeon: Abilio Hunter M.D.;  Location: SURGERY [facility];  Service: Plastics    RHYTIDECTOMY Right 6/7/2024    Procedure: RHYTIDECTOMY, FACE;  Surgeon: Abilio Hunter M.D.;  Location: SURGERY [facility];  Service: Plastics    ANGIOGRAM  04/14/2024    pt reports vessels clear    CATARACT EXTRACTION WITH IOL Bilateral 2021    CRANIOTOMY  01/01/2011    Meningioma    ARTHROSCOPY, KNEE Bilateral     SHOULDER ARTHROSCOPY Right    [3]   Current Outpatient Medications   Medication Sig Dispense Refill    rosuvastatin (CRESTOR) 20 MG Tab Take 1 Tablet by mouth every evening. 100 Tablet 3    Vitamin D,  Cholecalciferol, 50 MCG (2000 UT) Cap Take  by mouth every day. DO NOT TAKE 7 DAYS PRIOR TO SURGERY      Calcium Carb-Cholecalciferol (CALCIUM 500 + D PO) Take  by mouth every day. DO NOT TAKE 7 DAYS PRIOR TO SURGERY      acetaminophen (TYLENOL) 325 MG Tab Take 650 mg by mouth every 6 hours as needed. CONTINUE TAKING PRIOR TO SURGERY AND CAN TAKE DAY OF SURGERY      metoprolol SR (TOPROL XL) 25 MG TABLET SR 24 HR Take 1 Tablet by mouth every day. 30 Tablet 0    amphetamine-dextroamphetamine (ADDERALL) 10 MG Tab Take 5 mg by mouth see administration instructions. 10 mg = AM   10 mg = afternoon if needed  TAKE AS INSTRUCTED BY SURGEON       No current facility-administered medications for this visit.   [4]   Allergies  Allergen Reactions    Nkda [No Known Drug Allergy]

## (undated) DEVICE — PEN SKIN MARKER W/RULER - (50EA/BX)

## (undated) DEVICE — TUBE CONNECTING SUCTION - CLEAR PLASTIC STERILE 72 IN (50EA/CA)

## (undated) DEVICE — STOCKINET TUBULAR 3IN STERILE - 3 X 36 YDS (25/CA)

## (undated) DEVICE — DRAPE STRLE REG TOWEL 18X24 - (10/BX 4BX/CA)"

## (undated) DEVICE — SUTURE GENERAL

## (undated) DEVICE — SYRINGE 10 ML CONTROL LL (25EA/BX 4BX/CA)

## (undated) DEVICE — SPONGE GAUZE STER 4X4 8-PL - (2/PK 50PK/BX 12BX/CS)

## (undated) DEVICE — HUMID-VENT HEAT AND MOISTURE EXCHANGE- (50/BX)

## (undated) DEVICE — SUTURE 4-0 MONOCRYL PLUS PS-2 - 27 INCH (36/BX)

## (undated) DEVICE — DRESSING TRANSPARENT FILM TEGADERM 2.375 X 2.75"  (100EA/BX)"

## (undated) DEVICE — TUBING TUMESENCE - 10/BX

## (undated) DEVICE — NEEDLE NON SAFETY HYPO 22 GA X 1 1/2 IN (100/BX)

## (undated) DEVICE — GAUZE FLUFF STERILE 2-PLY 36 X 36 (100EA/CA)

## (undated) DEVICE — WATER IRRIGATION STERILE 1000ML (12EA/CA)

## (undated) DEVICE — TRAY CATHETER FOLEY URINE METER W/STATLOCK 350ML (10EA/CA)

## (undated) DEVICE — NEEDLE NON SAFETY 25 GA X 1 1/2 IN HYPO (100EA/BX)

## (undated) DEVICE — PAD LAP STERILE 18 X 18 - (5/PK 40PK/CA)

## (undated) DEVICE — DRAPE SURGICAL U 77X120 - (10/CA)

## (undated) DEVICE — SODIUM CHL IRRIGATION 0.9% 1000ML (12EA/CA)

## (undated) DEVICE — BAG SPONGE COUNT 10.25 X 32 - BLUE (250/CA)

## (undated) DEVICE — GOWN WARMING STANDARD FLEX - (30/CA)

## (undated) DEVICE — GLOVE BIOGEL SZ 7.5 SURGICAL PF LTX - (50PR/BX 4BX/CA)

## (undated) DEVICE — COVER LIGHT HANDLE FLEXIBLE - SOFT (2EA/PK 80PK/CA)

## (undated) DEVICE — BANDAGE ROLL STERILE BULKEE 4.5 IN X 4 YD (100EA/CA)

## (undated) DEVICE — SUTURE 5-0 MONOCRYL PLUS PC-3 - (12/BX)

## (undated) DEVICE — GLOVE BIOGEL SZ 6.5 SURGICAL PF LTX (50PR/BX 4BX/CA)

## (undated) DEVICE — GLOVE BIOGEL SZ 8 SURGICAL PF LTX - (50PR/BX 4BX/CA)

## (undated) DEVICE — DRAPE LARGE 3 QUARTER - (20/CA)

## (undated) DEVICE — NEEDLE SPINAL NON-SAFETY 20 GA X 3 IN (25/BX)

## (undated) DEVICE — SUTURE PLASTIC

## (undated) DEVICE — ELECTRODE DUAL RETURN W/ CORD - (50/PK)

## (undated) DEVICE — CHLORAPREP 26 ML APPLICATOR - ORANGE TINT(25/CA)

## (undated) DEVICE — SENSOR OXIMETER ADULT SPO2 RD SET (20EA/BX)

## (undated) DEVICE — CLOSURE SKIN STRIP 1/2 X 4 IN - (STERI STRIP) (50/BX 4BX/CA)

## (undated) DEVICE — SUCTION INSTRUMENT YANKAUER BULBOUS TIP W/O VENT (50EA/CA)

## (undated) DEVICE — TOWELS CLOTH SURGICAL - (4/PK 20PK/CA)

## (undated) DEVICE — PACK MINOR BASIN - (2EA/CA)

## (undated) DEVICE — TOWEL STOP TIMEOUT SAFETY FLAG (40EA/CA)

## (undated) DEVICE — DRESSING PETROLEUM GAUZE 5 X 9" (50EA/BX 4BX/CA)"

## (undated) DEVICE — BANDAGE TENSOPLAST 3 X 5 YDS - (1/EA)

## (undated) DEVICE — TRAY SRGPRP PVP IOD WT PRP - (20/CA)

## (undated) DEVICE — ADHESIVE MASTISOL - (48/BX)

## (undated) DEVICE — BLADE SURGICAL #15 - (50/BX 3BX/CA)

## (undated) DEVICE — CANISTER SUCTION RIGID RED 1500CC (40EA/CA)

## (undated) DEVICE — BOVIE BLADE COATED - (50/PK)

## (undated) DEVICE — SUTURE 5-0 PROLENE P-3 - (12/BX)

## (undated) DEVICE — BOVIE NEEDLE TIP INSULATD NON-SAFETY 2CM (50/PK)

## (undated) DEVICE — APPLICATOR COTTONTIP 3 IN - STERILE (10EA/PK 100PK/CA)

## (undated) DEVICE — SPONGE XRAY 8X4 STERL. 12PL - (10EA/TY 80TY/CA)

## (undated) DEVICE — DRESSING XEROFORM 1X8 - (50/BX 4BX/CA)

## (undated) DEVICE — PAD EYE GAUZE COVERED OVAL 1 5/8 X 2 5/8" STERILE"